# Patient Record
Sex: MALE | Race: WHITE | Employment: UNEMPLOYED | URBAN - METROPOLITAN AREA
[De-identification: names, ages, dates, MRNs, and addresses within clinical notes are randomized per-mention and may not be internally consistent; named-entity substitution may affect disease eponyms.]

---

## 2017-04-04 ENCOUNTER — HOSPITAL ENCOUNTER (OUTPATIENT)
Dept: GENERAL RADIOLOGY | Age: 22
Discharge: HOME | End: 2017-04-04
Payer: COMMERCIAL

## 2017-04-04 DIAGNOSIS — M54.2 NECK PAIN: ICD-10-CM

## 2017-04-04 DIAGNOSIS — M54.50 LOWER BACK PAIN: ICD-10-CM

## 2017-04-04 DIAGNOSIS — M54.9 UPPER BACK PAIN: ICD-10-CM

## 2017-04-04 PROCEDURE — 72050 X-RAY EXAM NECK SPINE 4/5VWS: CPT

## 2017-04-04 PROCEDURE — 72070 X-RAY EXAM THORAC SPINE 2VWS: CPT

## 2017-04-04 PROCEDURE — 72114 X-RAY EXAM L-S SPINE BENDING: CPT

## 2021-06-03 ENCOUNTER — OFFICE VISIT (OUTPATIENT)
Dept: SURGERY | Age: 26
End: 2021-06-03
Payer: MEDICARE

## 2021-06-03 VITALS
TEMPERATURE: 99.4 F | BODY MASS INDEX: 38.21 KG/M2 | WEIGHT: 258 LBS | DIASTOLIC BLOOD PRESSURE: 79 MMHG | RESPIRATION RATE: 18 BRPM | HEIGHT: 69 IN | OXYGEN SATURATION: 97 % | SYSTOLIC BLOOD PRESSURE: 125 MMHG | HEART RATE: 81 BPM

## 2021-06-03 DIAGNOSIS — F41.9 ANXIETY AND DEPRESSION: ICD-10-CM

## 2021-06-03 DIAGNOSIS — F32.A ANXIETY AND DEPRESSION: ICD-10-CM

## 2021-06-03 DIAGNOSIS — K21.9 GASTROESOPHAGEAL REFLUX DISEASE WITHOUT ESOPHAGITIS: ICD-10-CM

## 2021-06-03 DIAGNOSIS — F51.01 PRIMARY INSOMNIA: ICD-10-CM

## 2021-06-03 DIAGNOSIS — E66.01 SEVERE OBESITY (HCC): Primary | ICD-10-CM

## 2021-06-03 PROCEDURE — G8432 DEP SCR NOT DOC, RNG: HCPCS | Performed by: INTERNAL MEDICINE

## 2021-06-03 PROCEDURE — G8427 DOCREV CUR MEDS BY ELIG CLIN: HCPCS | Performed by: INTERNAL MEDICINE

## 2021-06-03 PROCEDURE — G8417 CALC BMI ABV UP PARAM F/U: HCPCS | Performed by: INTERNAL MEDICINE

## 2021-06-03 PROCEDURE — 99205 OFFICE O/P NEW HI 60 MIN: CPT | Performed by: INTERNAL MEDICINE

## 2021-06-03 RX ORDER — CHOLECALCIFEROL (VITAMIN D3) 50 MCG
2000 CAPSULE ORAL DAILY
COMMUNITY

## 2021-06-03 RX ORDER — GLUCOSAMINE SULFATE 1500 MG
1000 POWDER IN PACKET (EA) ORAL DAILY
COMMUNITY

## 2021-06-03 RX ORDER — ZINC GLUCONATE 10 MG
1 LOZENGE ORAL DAILY
COMMUNITY

## 2021-06-03 RX ORDER — ZOLPIDEM TARTRATE 12.5 MG/1
12.5 TABLET, FILM COATED, EXTENDED RELEASE ORAL
COMMUNITY
Start: 2021-05-10

## 2021-06-03 RX ORDER — PANTOPRAZOLE SODIUM 40 MG/1
40 TABLET, DELAYED RELEASE ORAL DAILY
COMMUNITY
Start: 2021-05-31

## 2021-06-03 RX ORDER — ESCITALOPRAM OXALATE 10 MG/1
10 TABLET ORAL DAILY
COMMUNITY
Start: 2021-05-28

## 2021-06-03 RX ORDER — MELATONIN 5 MG
5 CAPSULE ORAL
COMMUNITY

## 2021-06-03 NOTE — PROGRESS NOTES
Franki Jo Medically Supervised   [facility]     INITIAL PHYSICIAN VISIT    HISTORY OF PRESENT ILLNESS    Kendra Ashford is a 30 y.o. male with morbid Obesity Body mass index is 38.1 kg/m². and associated health concerns presents for evaluation and treatment of weight management. He has anxiety and is on Lexapro 10mg daily. He is followed by a psychologist and has been making a lot of progress in his mental health. He takes pantoprazole 40mg for reflux. He has been on Ambien since 2013 for sleep. He also takes melatonin. WEIGHT HISTORY    Current weight 258 lb. Lowest weight 170 lb. Maximum weight 258 lb. Short term goal weight 228 lb in 4 months. Long term goal weight 168 lb. Neck circumference 17.7 in. Waist circumference 50.5 in. Water 49.2. Body fat 31.7. BMI 37.9. Past Weight Loss Programs:  · Success: He previously did a weight loss program in 2016 with Dr. Dana Pimentel and lost 30 lbs, however he gained the weight back. Previous Weight Loss Medications (prescription or herbal): phentermine     Weight Loss Surgeries or Abdominal Surgeries: none    Family Hx of Obesity or Childhood Obesity: none    Hx of Eating Disorders: none    CURRENT HABITS    Eating/Drinking Habits:  · Meals per day/snacking: He eats 4-5 meals per day. He eats when he gets hungry which is usually every 3-4 hours. He eats foods like wheat and cheese and peanut butter jelly sandwiches. He likes to eat breaded chicken, but does not eat a lot of red meat. He eats his last meal 2-3 hrs before going to sleep. · Drinking Habits: He drinks 3 22 oz bottles of water per day. Sleep Habits: He goes to sleep anytime between 10PM and 2AM and will wake up after 3-4 hours and then go back to sleep for another 2 hours. Physical Activity: He is not exercising on a routine basis. He does walk 30-40 min 4-6x per week.        Depression Screening  H/o anxiety disorder on medication      Pt denies any contraindications to VLCD including: history of MI in the last 3 months, Type 1 DM, Type 2 DM, Liver or Kidney disease requiring protein restriction, Recent treatment for Cancer, History of Uric-acid Kidney Stone, Gout, Gall stones, Recent onset of Inflammatory Bowel Disease, or severe Food Allergies. ROS:    Review of Systems negative except as noted above in HPI. ALLERGIES:    Allergies   Allergen Reactions    Suprax [Cefixime] Unknown (comments)     As a child       CURRENT MEDICATIONS:      PAST MEDICAL HISTORY:  Anxiety disorder, Insomnia , GERD    PAST SURGICAL HISTORY:  NOne  FAMILY HISTORY:    Family History   Problem Relation Age of Onset    Cancer Father         non-Hodgkin's Lymphoma    Stroke Maternal Grandmother        SOCIAL HISTORY:    Social History     Socioeconomic History    Marital status: SINGLE     Spouse name: Not on file    Number of children: Not on file    Years of education: Not on file    Highest education level: Not on file   Tobacco Use    Smoking status: Never Smoker    Smokeless tobacco: Never Used     Social Determinants of Health     Financial Resource Strain:     Difficulty of Paying Living Expenses:    Food Insecurity:     Worried About Running Out of Food in the Last Year:     Ran Out of Food in the Last Year:    Transportation Needs:     Lack of Transportation (Medical):  Lack of Transportation (Non-Medical):    Physical Activity:     Days of Exercise per Week:     Minutes of Exercise per Session:    Stress:     Feeling of Stress :    Social Connections:     Frequency of Communication with Friends and Family:     Frequency of Social Gatherings with Friends and Family:     Attends Restorationism Services:     Active Member of Clubs or Organizations:     Attends Club or Organization Meetings:     Marital Status:        IMMUNIZATIONS:    There is no immunization history on file for this patient.       PHYSICAL EXAMINATION    Vital Signs    Visit Vitals  /79 (BP 1 Location: Left upper arm, BP Patient Position: Sitting, BP Cuff Size: Adult long)   Pulse 81   Temp 99.4 °F (37.4 °C) (Oral)   Resp 18   Ht 5' 9\" (1.753 m)   Wt 258 lb (117 kg)   SpO2 97%   BMI 38.10 kg/m²       Weight Metrics 6/3/2021 12/2/2011   Weight 258 lb 186 lb   BMI 38.1 kg/m2 27.87 kg/m2         General appearance - Well nourished. Well appearing. Well developed. No acute distress. Obese. Head - Normocephalic. Atraumatic. Eyes - pupils equal and reactive. Extraocular eye movements intact. Sclera anicteric. Mildly injected sclera. Ears - Hearing is grossly normal bilaterally. Nose - normal and patent. No polyps noted. No erythema. No discharge. Mouth - mucous membranes with adequate moisture. Posterior pharynx normal with cobblestone appearance. No erythema, white exudate or obstruction. Neck - supple. Midline trachea. No carotid bruits noted bilaterally. No thyromegaly noted. Chest - . No wheezes. No rales or rhonchi. Unlabored respirations. Heart - normal rate. Regular rhythm. Normal S1, S2. No murmur noted. No rubs, clicks or gallops noted. Neurological - awake, alert and oriented to person, place, and time and event. Cranial nerves II through XII intact. Clear speech. Muscle strength is +5/5 x 4 extremities. Sensation is intact to light touch bilaterally. Steady gait. Heme/Lymph - peripheral pulses normal x 4 extremities. No peripheral edema is noted. Musculoskeletal - Intact x 4 extremities. Full ROM x 4 extremities. Skin - no rashes, erythema, ecchymosis, lacerations, abrasions, suspicious moles noted. No skin tags or moles. No acanthosis nigricans noted in the axilla or neck. Psychological -   normal behavior, dress and thought processes. Good insight. Good eye contact. Normal affect. Appropriate mood. Normal speech. ASSESSMENT and PLAN    ICD-10-CM ICD-9-CM    1.  Severe obesity (HCC)  E66.01 278.01 We discussed in great length the various diets for weight loss including low carb diet, intermittent fasting, keto diet, pescetarian diet, and mediterranean diet. We discussed a physical activity plan and told him to walk 10 min after every meal and have a regular exercise routine. I did discuss the importance of 5000 steps /day for Healthy life style and more than 10,000 to loose weight. Answers all the questions about weight lifting exercises. Heidi Miranda He is requesting medication since this has helped with weight loss in the past, but I recommend diet and physical activity first.   He and his mother had lot of questions about our Supplements and all concerns were answered. 2. Anxiety and depression  F41.9 300.00 He is on Lexapro by his regular PCP. F32.9 311    3. Gastroesophageal reflux disease without esophagitis  K21.9 530.81 He takes pantoprazole. 4. Primary insomnia  F51.01 307.42 He takes melatonin and Ambien. Referred patient to [facility] Contreras ( )  for LCD package. Exercise: Told patient to wear his FitBit regularly to monitor his steps. Explained that he should be walking 5,000 steps daily to maintain conditioning and weight and increase to at least 10,000 steps to work on losing weight. Patient was offered a choice/choices in the treatment plan today. Patient expresses understanding of the plan and agrees with recommendations. More than 65 mins spent face to face with patient and more than 50% of this time spent in counseling and coordinating care and/or discussing treatment plans in reference to The primary encounter diagnosis was Severe obesity ([address] Diagnoses of Anxiety and depression, Gastroesophageal reflux disease without esophagitis, and Primary insomnia were also pertinent to this visit.     Follow up: 1 week    Written by Aileen Hale, as dictated by Dr. Maryjane Gutierrez MD.

## 2021-06-03 NOTE — PROGRESS NOTES
Seeing Dr. Ronak Henao for Metabolic Clinic. 1. Have you been to the ER, urgent care clinic since your last visit? Hospitalized since your last visit? No    2. Have you seen or consulted any other health care providers outside of the [address] [place] [address] since your last visit? Include any pap smears or colon screening.  Yes, PCP     BMI -37.9  Body Fat - 31.7  Water - 49.2    Neck - 17.5  Waist - 50.5

## 2021-06-09 ENCOUNTER — CLINICAL SUPPORT (OUTPATIENT)
Dept: BARIATRICS/WEIGHT MGMT | Age: 26
End: 2021-06-09

## 2021-06-09 DIAGNOSIS — E66.01 MORBID OBESITY (HCC): Primary | ICD-10-CM

## 2021-06-09 NOTE — PROGRESS NOTES
Metabolic Program Initial Nutrition Consult    Date: 2021   Physician: Dr. Marcos Bryant  Name: Joel Barajas  :  1995  Age:  30  Gender: M  Type of Plan: LCD  Weeks on Plan:  1 week    ASSESSMENT:      Medications/Supplements:   Prior to Admission medications    Medication Sig Start Date End Date Taking? Authorizing Provider   pantoprazole (PROTONIX) 40 mg tablet Take 40 mg by mouth daily. 21   Provider, Historical   escitalopram oxalate (LEXAPRO) 10 mg tablet Take 10 mg by mouth daily. 21   Provider, Historical   zolpidem CR (AMBIEN CR) 12.5 mg tablet TAKE 1 TABLET BY MOUTH EVERY DAY AT BEDTIME AS NEEDED 5/10/21   Provider, Historical   MEN'S MULTI-VITAMIN PO Take 1 Tablet by mouth daily. Provider, Historical   omega 3-dha-epa-fish oil (Fish Oil) 100-160-1,000 mg cap Take 2,000 mg by mouth daily. Provider, Historical   cholecalciferol (Vitamin D3) 25 mcg (1,000 unit) cap Take 1,000 Units by mouth daily. Provider, Historical   magnesium 250 mg tab Take 1 Tablet by mouth daily. Provider, Historical   melatonin 5 mg cap capsule Take 5 mg by mouth nightly. Provider, Historical       Food Allergies/Intolerances: no known food allergies    Anthropometrics:    Ht:69\"   Wt: 258#    IBW: 159#       BMI:38    Category: Obesity Class II    Reported wt history: Pt presents today for initial nutrition consult for the [address]      Attributes wt gain over the years r/t eating in excess and not appropriate choices. .     Has attempted wt loss through various methods with most successful wt loss of phentermine. Exercise/Physical Activity: none at this time but has a goal to start weight lifting after he loses some weight.     Reported Diet History:  Ate all bars within first two days, no meal replacement shakes  Now consuming 2-3 shakes per day mixed with water  Meal/Snack:  Not consuming a balanced meal.  Only consuming 1 cup of plain fage yogurt 0% milk fat with blueberries that he estimates is approx. 300 calories    Patient has a very limited diet, will not eat most foods - denied it was because of texture or taste, just \"didn't like. \"  Will eat:  breaded chicken tenders, tilapia, eggs. Only vegetable he will eat is broccoli and corn with butter spray or veggie pasta with zucchini and lentils that has 51 g CHO per bag. Greatly dislikes others, especially carrots and cauliflower. Loves pizza, cheese and crackers. Has not had pizza or cheese since starting program last week. Fluid: 72 oz water every day. Flavors with skittles drink mix that contains 2 grams CHO per packet. Consumes at least 6 packets a day. No coffee, no tea, no alcohol. Environment/Psychosocial/Support:parents . Lives with both, currently at dad's house during call. NUTRITION INTERVENTION:  Pt educated on nutrition recommendations for LCD, specifically balanced meals and snacks PLUS two shakes per day. Instructed on consuming 1 balanced meal per day starting now, not just consuming yogurt with blueberries every day. Use the balanced plate method to plan meals, include 4-6oz of lean source of protein, unlimited non-starchy vegetables. Limit whole grains, fruit and low fat dairy. Consume only one of these three at meal and only a half a cup. Utilize handouts listing healthy snack and meal ideas. Read all nutrition labels. Demonstrated and emphasized identifying serving size, total fat, sugar and protein content. Discussed lean and extra lean sources of protein. Avoid foods with sugar listed in the first 3 ingredients and >/10 g sugar per serving. Practice mindful eating habits; take small bites, chew thoroughly, avoid distractions, utilize hunger/fullness scale. Consume meals over 20-30 minutes. Attend Metabolic Support Group and increase physical activity (as approved by MD) for long term weight maintenance.       NUTRITION MONITORING AND EVALUATION:    The following goals were established with patient;  1) garlic powder to encourage intake of other vegetables  2) increase variety with meals, try veggie pasta (tennis ball size) with broccoli and fish for a meal this week. 3) Limit skittles packets to 1-2 per day reduce excess CHO intake. 4) Do not consume more than one protein bar from program in a day. Should be two meal replacement powders PLUS one snack (could be bar or powder) AND one balanced meal..      Very simple goals to start. Patient required extensive encouraging and education as to why he cannot only consume the same thing every single day. Adherence to program and goals questionable at this time. Routine follow-up is highly encouraged in a one on one setting with RD rather than only attending group education. Follow-up scheduled for next week. Specific tips and techniques to facilitate compliance with above recommendations were provided and discussed. If further details are desired please contact me at [phone]. This phone number was also provided to the patient for any further questions or concerns.           Katelin Colorado MS, RD, LDN

## 2021-06-10 ENCOUNTER — OFFICE VISIT (OUTPATIENT)
Dept: SURGERY | Age: 26
End: 2021-06-10
Payer: MEDICARE

## 2021-06-10 VITALS
WEIGHT: 251.3 LBS | SYSTOLIC BLOOD PRESSURE: 130 MMHG | HEIGHT: 69 IN | HEART RATE: 93 BPM | OXYGEN SATURATION: 96 % | RESPIRATION RATE: 18 BRPM | BODY MASS INDEX: 37.22 KG/M2 | DIASTOLIC BLOOD PRESSURE: 78 MMHG | TEMPERATURE: 98.5 F

## 2021-06-10 DIAGNOSIS — F51.01 PRIMARY INSOMNIA: ICD-10-CM

## 2021-06-10 DIAGNOSIS — E66.01 SEVERE OBESITY (HCC): Primary | ICD-10-CM

## 2021-06-10 DIAGNOSIS — F41.9 ANXIETY: ICD-10-CM

## 2021-06-10 PROCEDURE — 99214 OFFICE O/P EST MOD 30 MIN: CPT | Performed by: INTERNAL MEDICINE

## 2021-06-10 PROCEDURE — G8432 DEP SCR NOT DOC, RNG: HCPCS | Performed by: INTERNAL MEDICINE

## 2021-06-10 PROCEDURE — G8427 DOCREV CUR MEDS BY ELIG CLIN: HCPCS | Performed by: INTERNAL MEDICINE

## 2021-06-10 PROCEDURE — G8417 CALC BMI ABV UP PARAM F/U: HCPCS | Performed by: INTERNAL MEDICINE

## 2021-06-10 NOTE — PROGRESS NOTES
Metabolic Clinic follow up. 1. Have you been to the ER, urgent care clinic since your last visit? Hospitalized since your last visit? No    2. Have you seen or consulted any other health care providers outside of the [address] [place] [address] since your last visit? Include any pap smears or colon screening.  No     BMI - 36.9  Water - 49.2

## 2021-06-10 NOTE — PROGRESS NOTES
Franki Jo Medically Supervised   [facility] Loss [facility]     FOLLOW-UP PHYSICIAN VISIT    HISTORY OF PRESENT ILLNESS    Lisa Xiong is a 30 y.o. male with Obesity Body mass index is 37.11 kg/m². and associated health concerns presents for evaluation and treatment of weight management. WEIGHT HISTORY    Current weight 251 lb. Weight change since previous visit -7 lbs in 1 week. Short term goal weight 228 lb in 4 months. Neck circumference 17.5 in. Neck circumference change -0.2 in. Waist circumference 49.5 in. Waist circumference change -1 in. Water 49.2. Water change 0. Body fat 31.7%. Body fat change 0%. Current BMI 36.9. CURRENT HABITS    Eating/Drinking Habits:   Meals per day/snacking: He drinks 2-3 shakes (600 calories per day), 1-2 proteins bars (120 calories per bar), and 2 servings of yogurt and blueberries (300 calories). He felt tired when he first started the diet, but has been feeling well recently. Sleep Habits: He has been sleeping well at night. He takes melatonin and Ambien for sleep. Physical Activity: He has not started exercising. He is moving to a new room and will not have access to his exercise equipment for the next 2 weeks. Depression Screening  Doing well on Lexapro. Not feeling depress or anxious. Pt denies any contraindications to VLCD including: history of MI in the last 3 months, Type 1 DM, Type 2 DM, Liver or Kidney disease requiring protein restriction, Recent treatment for Cancer, History of Uric-acid Kidney Stone, Gout, Gall stones, Recent onset of Inflammatory Bowel Disease, or severe Food Allergies. ROS:    Review of Systems negative except as noted above in HPI.        ALLERGIES:    Allergies   Allergen Reactions    Suprax [Cefixime] Unknown (comments)     As a child       CURRENT MEDICATIONS:    Current Outpatient Medications   Medication Instructions    cholecalciferol (VITAMIN D3) 1,000 Units, Oral, DAILY    escitalopram oxalate (LEXAPRO) 10 mg, Oral, DAILY    magnesium 250 mg tab 1 Tablet, Oral, DAILY    melatonin 5 mg, Oral, EVERY BEDTIME    MEN'S MULTI-VITAMIN PO 1 Tablet, Oral, DAILY    omega 3-dha-epa-fish oil (Fish Oil) 100-160-1,000 mg cap 2,000 mg, Oral, DAILY    pantoprazole (PROTONIX) 40 mg, Oral, DAILY    zolpidem CR (AMBIEN CR) 12.5 mg, Oral, EVERY BEDTIME       PAST MEDICAL HISTORY:  No past medical history on file. PAST SURGICAL HISTORY:  No past surgical history on file. FAMILY HISTORY:    Family History   Problem Relation Age of Onset    Cancer Father         non-Hodgkin's Lymphoma    Stroke Maternal Grandmother        SOCIAL HISTORY:    Social History     Socioeconomic History    Marital status: SINGLE     Spouse name: Not on file    Number of children: Not on file    Years of education: Not on file    Highest education level: Not on file   Tobacco Use    Smoking status: Never Smoker    Smokeless tobacco: Never Used     Social Determinants of Health     Financial Resource Strain:     Difficulty of Paying Living Expenses:    Food Insecurity:     Worried About Running Out of Food in the Last Year:     [address] in the Last Year:    Transportation Needs:     Lack of Transportation (Medical):      Lack of Transportation (Non-Medical):    Physical Activity:     Days of Exercise per Week:     Minutes of Exercise per Session:    Stress:     Feeling of Stress :    Social Connections:     Frequency of Communication with Friends and Family:     Frequency of Social Gatherings with Friends and Family:     Attends Restorationist Services:     Active Member of Clubs or Organizations:     Attends Club or Organization Meetings:     Marital Status:          PHYSICAL EXAMINATION    Vital Signs    Visit Vitals  /78 (BP 1 Location: Left upper arm, BP Patient Position: Sitting, BP Cuff Size: Adult long)   Pulse 93   Temp 98.5 °F (36.9 °C) (Oral)   Resp 18   Ht 5' 9\" (1.753 m)   Wt 251 lb 4.8 oz (114 kg) SpO2 96%   BMI 37.11 kg/m²       Weight Metrics 6/10/2021 6/10/2021 6/3/2021 12/2/2011   Weight - 251 lb 4.8 oz 258 lb 186 lb   Neck Circ (inches) 17.5 - - -   Waist Measure Inches 49.5 - - -   Body Fat % 31.7 - - -   BMI - 37.11 kg/m2 38.1 kg/m2 27.87 kg/m2         General appearance - Well nourished. Well appearing. Well developed. No acute distress. Obese. Head - Normocephalic. Atraumatic. Eyes - pupils equal and reactive. Extraocular eye movements intact. Sclera anicteric. Ears - Hearing is grossly normal bilaterally. Nose - normal and patent. No polyps noted. No erythema. No discharge. Mouth - mucous membranes with adequate moisture. Posterior pharynx normal with cobblestone appearance. Neck - supple. ROM cervical spine normal.   Chest - . No wheezing . Unlabored respirations. Heart - normal rate. Regular rhythm. Normal S1, S2.   Abdomen - soft and distended. No masses or organomegaly. .  Neurological - awake, alert and oriented to person, place, and time and event. Clear speech. Muscle strength is +5/5 x 4 extremities. Steady gait. Heme/Lymph - peripheral pulses normal x 4 extremities. No peripheral edema is noted. Musculoskeletal - Intact x 4 extremities. Full ROM x 4 extremities. No pain with movement. Back exam - normal range of motion. .  No buffalo hump noted. Skin - no rashes, erythema, ecchymosis, lacerations, abrasions, suspicious moles noted. No skin tags or moles. No acanthosis nigricans noted in the axilla or neck. Psychological -   normal behavior, dress and thought processes. Good insight. Good eye contact. Normal affect. Appropriate mood. Normal speech. ASSESSMENT and PLAN    ICD-10-CM ICD-9-CM    1. Severe obesity ([facility] [address]  E66.01 278.01 He is doing well on the LCD and has lost 7 lbs since 06/03/21, but has not started exercising.  He cannot do any structured exercise for the next 2 weeks so I encouraged home to walk at least 10 min after every meal. Discussed that with more physical activity he may feel more tired and need to increase daily calories to 1500/day. We will continue him on LCD. 2. Anxiety  F41.9 300.00 He is on Lexapro by his regular PCP. 3. Primary insomnia  F51.01 307.42 He takes melatonin and Ambien. Patient was offered a choice/choices in the treatment plan today. Patient expresses understanding of the plan and agrees with recommendations. More than 30 mins spent face to face with patient and more than 50% of this time spent in counseling and coordinating care and/or discussing treatment plans in reference to The primary encounter diagnosis was Severe obesity ([facility] [address] Diagnoses of Anxiety and Primary insomnia were also pertinent to this visit.     Follow up: 2 weeks     Written by Denys Whitman, as dictated by Dr. Doug Rivas MD.

## 2021-06-17 ENCOUNTER — CLINICAL SUPPORT (OUTPATIENT)
Dept: SURGERY | Age: 26
End: 2021-06-17

## 2021-06-17 DIAGNOSIS — E66.9 OBESITY (BMI 30-39.9): Primary | ICD-10-CM

## 2021-06-22 ENCOUNTER — OFFICE VISIT (OUTPATIENT)
Dept: SURGERY | Age: 26
End: 2021-06-22
Payer: MEDICARE

## 2021-06-22 VITALS
DIASTOLIC BLOOD PRESSURE: 84 MMHG | SYSTOLIC BLOOD PRESSURE: 128 MMHG | OXYGEN SATURATION: 95 % | BODY MASS INDEX: 36.7 KG/M2 | HEIGHT: 69 IN | WEIGHT: 247.8 LBS | RESPIRATION RATE: 18 BRPM | TEMPERATURE: 99.3 F | HEART RATE: 68 BPM

## 2021-06-22 DIAGNOSIS — Z13.220 SCREENING FOR HYPERLIPIDEMIA: ICD-10-CM

## 2021-06-22 DIAGNOSIS — Z13.29 SCREENING FOR HYPOTHYROIDISM: ICD-10-CM

## 2021-06-22 DIAGNOSIS — Z13.1 SCREENING FOR DIABETES MELLITUS: ICD-10-CM

## 2021-06-22 DIAGNOSIS — E66.01 CLASS 3 SEVERE OBESITY DUE TO EXCESS CALORIES WITHOUT SERIOUS COMORBIDITY IN ADULT, UNSPECIFIED BMI (HCC): Primary | ICD-10-CM

## 2021-06-22 PROCEDURE — 99214 OFFICE O/P EST MOD 30 MIN: CPT | Performed by: FAMILY MEDICINE

## 2021-06-22 PROCEDURE — G8510 SCR DEP NEG, NO PLAN REQD: HCPCS | Performed by: FAMILY MEDICINE

## 2021-06-22 PROCEDURE — G8417 CALC BMI ABV UP PARAM F/U: HCPCS | Performed by: FAMILY MEDICINE

## 2021-06-22 PROCEDURE — G8427 DOCREV CUR MEDS BY ELIG CLIN: HCPCS | Performed by: FAMILY MEDICINE

## 2021-06-22 NOTE — PROGRESS NOTES
1. Have you been to the ER, urgent care clinic since your last visit? Hospitalized since your last visit? no    2. Have you seen or consulted any other health care providers outside of the [address] [place] [address] since your last visit? Include any pap smears or colon screening.

## 2021-06-22 NOTE — PROGRESS NOTES
[facility] Progress Note:   F/up Physician Visit  [facility]  CC: Weight Management    He is having 2 300 irene meals and then 3 products of ours    aTina Mckeon is a 30 y.o. male who is here for his  f/up physician visit for LCD Program.  May 251  Now 147  He did not bring the food diary      Weight Metrics 6/22/2021 6/22/2021 6/10/2021 6/10/2021 6/3/2021 12/2/2011   Weight - 247 lb 12.8 oz - 251 lb 4.8 oz 258 lb 186 lb   Neck Circ (inches) 17 - 17.5 - - -   Waist Measure Inches 49.5 - 49.5 - - -   Body Fat % 31 - 31.7 - - -   BMI - 36.59 kg/m2 - 37.11 kg/m2 38.1 kg/m2 27.87 kg/m2         Outpatient Medications Marked as Taking for the 6/22/21 encounter (Office Visit) with Tiara Keenan MD   Medication Sig Dispense Refill    pantoprazole (PROTONIX) 40 mg tablet Take 40 mg by mouth daily.  escitalopram oxalate (LEXAPRO) 10 mg tablet Take 10 mg by mouth daily.  zolpidem CR (AMBIEN CR) 12.5 mg tablet Take 12.5 mg by mouth nightly.  MEN'S MULTI-VITAMIN PO Take 1 Tablet by mouth daily.  omega 3-dha-epa-fish oil (Fish Oil) 100-160-1,000 mg cap Take 2,000 mg by mouth daily.  cholecalciferol (Vitamin D3) 25 mcg (1,000 unit) cap Take 1,000 Units by mouth daily.  magnesium 250 mg tab Take 1 Tablet by mouth daily.  melatonin 5 mg cap capsule Take 5 mg by mouth nightly. Participation   Did you attend clinic and class last week? yes    Review of Systems  Since your last visit, have you experienced any complications? no  If yes, please list:       Are you taking an appetite suppressant? no  If so, is there any Chest Pain, Palpitations or Dizziness? BP Readings from Last 3 Encounters:   06/22/21 128/84   06/10/21 130/78   06/03/21 125/79       SLEEP:frequently interrupted, avg 5 hrs    Have you received any other medical care this week? no  If yes, where and for what?        Have you discontinued or changed any medicine or dose of your medicine since your last visit with Dr Lalo Blancas? no  If yes, where and for what? Diet  How many ounces of calorie-free liquids did you consume each day?  90 oz    How many meal replacements did you take each day? 2-3    Did you have any problems adhering to the program?  no If yes, please explain:      Exercise  Aerobic exercise: went bowling last sunday  Resistance exercise: 0 workouts / week  Any discomfort?  no     If yes, where? Objective  Visit Vitals  /84   Pulse 68   Temp 99.3 °F (37.4 °C) (Oral)   Resp 18   Ht 5' 9\" (1.753 m)   Wt 247 lb 12.8 oz (112.4 kg)   SpO2 95%   BMI 36.59 kg/m²     No LMP for male patient. Physical Exam  Appearance: well,   Mental:A&O x 3, NAD  H:NC/AT,  EENT:   EOMI, PERRL, No scleral icterus  Neck: no bruit or JVD  Lung: clear, No W/R  ABD: soft, active, nontender  Ext:  no Edema  Neuro: nonfocal  Assessment / Plan    Encounter Diagnoses   Name Primary?  Class 3 severe obesity due to excess calories without serious comorbidity in adult, unspecified BMI ([facility] [address] Yes    BMI 36.0-36.9,adult     Screening for hypothyroidism     Screening for diabetes mellitus     Screening for hyperlipidemia      Diagnoses and all orders for this visit:    1. Class 3 severe obesity due to excess calories without serious comorbidity in adult, unspecified BMI (HCC)  -     METABOLIC PANEL, COMPREHENSIVE; Future  -     TSH 3RD GENERATION; Future  -     LIPID PANEL; Future    2. BMI 47.2-58.5,OVMID  -     METABOLIC PANEL, COMPREHENSIVE; Future  -     TSH 3RD GENERATION; Future  -     LIPID PANEL; Future    3. Screening for hypothyroidism  -     TSH 3RD GENERATION; Future    4. Screening for diabetes mellitus  -     HEMOGLOBIN A1C WITH EAG; Future    5. Screening for hyperlipidemia  -     LIPID PANEL; Future      1. Weight management improved   Progress was reviewed with patient    2.   Labs    Latest results reviewed with patient         Over 25 minutes of the 30 minutes face to face time with Rody Shipley consisted of counseling & coordinating and/or discussing treatment plans in reference to his obesity The primary encounter diagnosis was Class 3 severe obesity due to excess calories without serious comorbidity in adult, unspecified BMI ([facility] [address] Diagnoses of BMI 36.0-36.9,adult, Screening for hypothyroidism, Screening for diabetes mellitus, and Screening for hyperlipidemia were also pertinent to this visit.

## 2021-06-23 ENCOUNTER — CLINICAL SUPPORT (OUTPATIENT)
Dept: SURGERY | Age: 26
End: 2021-06-23

## 2021-06-30 ENCOUNTER — CLINICAL SUPPORT (OUTPATIENT)
Dept: SURGERY | Age: 26
End: 2021-06-30

## 2021-07-01 NOTE — PROGRESS NOTES
Metabolic Program Followup Nutrition Consult    Date: 2021   Physician: Guido Cranker, MD  Name: Celia Munoz  :  1995    Type of Plan: LCD  Weeks on Plan: 1  Virtual visit was completed through [address] [place] [address]. Mother was present on Zoom call. ASSESSMENT:    Medications/Supplements:   Prior to Admission medications    Medication Sig Start Date End Date Taking? Authorizing Provider   pantoprazole (PROTONIX) 40 mg tablet Take 40 mg by mouth daily. 21   Provider, Historical   escitalopram oxalate (LEXAPRO) 10 mg tablet Take 10 mg by mouth daily. 21   Provider, Historical   zolpidem CR (AMBIEN CR) 12.5 mg tablet Take 12.5 mg by mouth nightly. 5/10/21   Provider, Historical   MEN'S MULTI-VITAMIN PO Take 1 Tablet by mouth daily. Provider, Historical   omega 3-dha-epa-fish oil (Fish Oil) 100-160-1,000 mg cap Take 2,000 mg by mouth daily. Provider, Historical   cholecalciferol (Vitamin D3) 25 mcg (1,000 unit) cap Take 1,000 Units by mouth daily. Provider, Historical   magnesium 250 mg tab Take 1 Tablet by mouth daily. Provider, Historical   melatonin 5 mg cap capsule Take 5 mg by mouth nightly. Provider, Historical     Exercise/Physical Activity:none    Reported Diet History: 3 packets and yogurt plus blueberries, staying within 4317-3181 calories per day. Water with skittles flavoring packets, 60+ oz every day. Environment/Psychosocial/Support:mother supportive and encouraging throughout call to try new foods. NUTRITION INTERVENTION:  Pt educated on nutrition recommendations for LCD, specifically increasing variety with grocery meal using pattern described. Grocery meal:  Use the balanced plate method to plan meals, include 3-6 oz of lean source of protein, unlimited non-starchy vegetables, 1/2 cup whole grains/beans OR 1/2 cup fruit OR 1 serving of low fat dairy. Utilize handouts listing healthy snack and meal ideas to limit restaurant meals. Read all nutrition labels. Demonstrated and emphasized identifying serving size, total fat, sugar and protein content. Defined low fat as </= 3 g per serving. Discussed lean and extra lean sources of protein. Provided list of low fat cooking methods. Avoid foods with sugar listed in the first 3 ingredients and >/15 g sugar per serving. Excess sugar/fat intake may lead to dumping syndrome. Discussed signs and symptoms of dumping syndrome. Practice mindful eating habits; take small bites, chew thoroughly, avoid distractions, utilize hunger/fullness scale. Attend Metabolic Support Group and increase physical activity (approved per MD) for long term weight maintenance. NUTRITION MONITORING AND EVALUATION:    The following goals were established with patient;  1) try bbq chicken  2) try asparagus  3) try a new meat. 4) increase movement as tolerated    Limited variety of foods, most of the time was spent trying to agree on one or two new foods to try by next visit. Specific tips and techniques to facilitate compliance with above recommendations were provided and discussed. If further details are desired please contact me at [phone]. This phone number was also provided to the patient for any further questions or concerns.           Edu Payton, MS, RD, LDN

## 2021-07-01 NOTE — PROGRESS NOTES
Metabolic Program Follow-up Nutrition Consult    Date: 2021   Physician: Shahida Kuhn MD, previously saw David Bower MD  Name: Dolores Dinero  :  1995  Age:  30  Gender: M  Type of Plan: LCD  Weeks on Plan: 3  Virtual visit was completed through Zoom. ASSESSMENT:    Medications/Supplements:   Prior to Admission medications    Medication Sig Start Date End Date Taking? Authorizing Provider   pantoprazole (PROTONIX) 40 mg tablet Take 40 mg by mouth daily. 21   Provider, Historical   escitalopram oxalate (LEXAPRO) 10 mg tablet Take 10 mg by mouth daily. 21   Provider, Historical   zolpidem CR (AMBIEN CR) 12.5 mg tablet Take 12.5 mg by mouth nightly. 5/10/21   Provider, Historical   MEN'S MULTI-VITAMIN PO Take 1 Tablet by mouth daily. Provider, Historical   omega 3-dha-epa-fish oil (Fish Oil) 100-160-1,000 mg cap Take 2,000 mg by mouth daily. Provider, Historical   cholecalciferol (Vitamin D3) 25 mcg (1,000 unit) cap Take 1,000 Units by mouth daily. Provider, Historical   magnesium 250 mg tab Take 1 Tablet by mouth daily. Provider, Historical   melatonin 5 mg cap capsule Take 5 mg by mouth nightly. Provider, Historical       Wt loss: down 10# since start of program 3 weeks ago. Exercise/Physical Activity:none    Reported Diet History:    24 Hour Diet Recall  Breakfast  shake   Lunch  Chicken tenders   Dinner  shake   Snacks  shake   Beverages  Water 60-90 oz every day       Environment/Psychosocial/Support:mother supportive    NUTRITION INTERVENTION:  Pt educated on nutrition recommendations for LCD, specifically trying to increase variety of options at grocery meal following grocery meal pattern. Encouraged him to try one new food on vacation next week, he chose to try shrimp, lobster, or crab. He has stated he has a \"childlike\" pallet. Trying new foods has been difficult since program initiation, not trying options discussed in last visit.     Grocery meal:  Use the balanced plate method to plan meals, include 3-6 oz of lean source of protein, unlimited non-starchy vegetables, 1/2 cup whole grains/beans OR 1/2 cup fruit OR 1 serving of low fat dairy. Utilize handouts listing healthy snack and meal ideas to limit restaurant meals. Read all nutrition labels. Demonstrated and emphasized identifying serving size, total fat, sugar and protein content. Defined low fat as </= 3 g per serving. Discussed lean and extra lean sources of protein. Provided list of low fat cooking methods. Avoid foods with sugar listed in the first 3 ingredients and >/15 g sugar per serving. Excess sugar/fat intake may lead to dumping syndrome. Discussed signs and symptoms of dumping syndrome. Practice mindful eating habits; take small bites, chew thoroughly, avoid distractions, utilize hunger/fullness scale. Attend Metabolic Support Group and increase physical activity (approved per MD) for long term weight maintenance. NUTRITION MONITORING AND EVALUATION:    The following goals were established with patient;  1) try crab, lobster, or shrimp at beach on vacation  2) add a green option to meal such as trying asparagus  3) start increasing movement, walk 5-10 minutes 2-3 times per day    Pt is doing very well staying in specified calorie recommendations of 1000-1200kcals every day, reading labels, and measuring. Encouraged to try ONE new food before next visit. He verbalized compliance to this, although questionable if he will. Specific tips and techniques to facilitate compliance with above recommendations were provided and discussed. If further details are desired please contact me at [phone]. This phone number was also provided to the patient for any further questions or concerns.           Eris Rivera, MS, RD, LDN

## 2021-07-01 NOTE — PROGRESS NOTES
Metabolic Program Followup Nutrition Consult    Date: 2021  Physician: Deena Mendez MD  Name: Richad Gottron  :  1995   Type of Plan: LCD  Weeks on Plan: 2  Virtual visit was completed through [address] [place] [address]. ASSESSMENT:    Medications/Supplements:   Prior to Admission medications    Medication Sig Start Date End Date Taking? Authorizing Provider   pantoprazole (PROTONIX) 40 mg tablet Take 40 mg by mouth daily. 21   Provider, Historical   escitalopram oxalate (LEXAPRO) 10 mg tablet Take 10 mg by mouth daily. 21   Provider, Historical   zolpidem CR (AMBIEN CR) 12.5 mg tablet Take 12.5 mg by mouth nightly. 5/10/21   Provider, Historical   MEN'S MULTI-VITAMIN PO Take 1 Tablet by mouth daily. Provider, Historical   omega 3-dha-epa-fish oil (Fish Oil) 100-160-1,000 mg cap Take 2,000 mg by mouth daily. Provider, Historical   cholecalciferol (Vitamin D3) 25 mcg (1,000 unit) cap Take 1,000 Units by mouth daily. Provider, Historical   magnesium 250 mg tab Take 1 Tablet by mouth daily. Provider, Historical   melatonin 5 mg cap capsule Take 5 mg by mouth nightly. Provider, Historical     Down a few pounds since last week. Still feeling motivated. Still restrictive eating, hasn't tried options discussed in last visit. Exercise/Physical Activity:none    Reported Diet History: 3 packets and 3 eggs PLUS 1/4 cup cheese with broccoli and yogurt with blueberries    Environment/Psychosocial/Support:family supportive    NUTRITION INTERVENTION:  Pt educated on nutrition recommendations for LCD, specifically adding variety to grocery meal using pattern described below. Continue using nutrition labels as guidance to stay within 9908-7624 cals. Grocery meal:  Use the balanced plate method to plan meals, include 3-6 oz of lean source of protein, unlimited non-starchy vegetables, 1/2 cup whole grains/beans OR 1/2 cup fruit OR 1 serving of low fat dairy.  Utilize handouts listing healthy snack and meal ideas to limit restaurant meals. Read all nutrition labels. Demonstrated and emphasized identifying serving size, total fat, sugar and protein content. Defined low fat as </= 3 g per serving. Discussed lean and extra lean sources of protein. Provided list of low fat cooking methods. Avoid foods with sugar listed in the first 3 ingredients and >/15 g sugar per serving. Excess sugar/fat intake may lead to dumping syndrome. Discussed signs and symptoms of dumping syndrome. Practice mindful eating habits; take small bites, chew thoroughly, avoid distractions, utilize hunger/fullness scale. Attend Metabolic Support Group and increase physical activity (approved per MD) for long term weight maintenance. NUTRITION MONITORING AND EVALUATION:    The following goals were established with patient;  1) try cauliflower  2) try tilapia  3) one protein bar every day  4) increase movement as tolerated. Specific tips and techniques to facilitate compliance with above recommendations were provided and discussed. If further details are desired please contact me at [phone]. This phone number was also provided to the patient for any further questions or concerns.           Iwona Che, MS, RD, LDN

## 2021-07-06 LAB
ALBUMIN SERPL-MCNC: 4.6 G/DL (ref 4.1–5.2)
ALBUMIN/GLOB SERPL: 2.2 {RATIO} (ref 1.2–2.2)
ALP SERPL-CCNC: 74 IU/L (ref 48–121)
ALT SERPL-CCNC: 35 IU/L (ref 0–44)
AST SERPL-CCNC: 24 IU/L (ref 0–40)
BILIRUB SERPL-MCNC: 0.5 MG/DL (ref 0–1.2)
BUN SERPL-MCNC: 19 MG/DL (ref 6–20)
BUN/CREAT SERPL: 19 (ref 9–20)
CALCIUM SERPL-MCNC: 10 MG/DL (ref 8.7–10.2)
CHLORIDE SERPL-SCNC: 102 MMOL/L (ref 96–106)
CHOLEST SERPL-MCNC: 197 MG/DL (ref 100–199)
CO2 SERPL-SCNC: 26 MMOL/L (ref 20–29)
CREAT SERPL-MCNC: 0.98 MG/DL (ref 0.76–1.27)
GLOBULIN SER CALC-MCNC: 2.1 G/DL (ref 1.5–4.5)
GLUCOSE SERPL-MCNC: 90 MG/DL (ref 65–99)
HBA1C MFR BLD: 4.9 % (ref 4.8–5.6)
HDLC SERPL-MCNC: 36 MG/DL
LDLC SERPL CALC-MCNC: 131 MG/DL (ref 0–99)
POTASSIUM SERPL-SCNC: 5 MMOL/L (ref 3.5–5.2)
PROT SERPL-MCNC: 6.7 G/DL (ref 6–8.5)
SODIUM SERPL-SCNC: 140 MMOL/L (ref 134–144)
TRIGL SERPL-MCNC: 169 MG/DL (ref 0–149)
TSH SERPL DL<=0.005 MIU/L-ACNC: 1 UIU/ML (ref 0.45–4.5)
VLDLC SERPL CALC-MCNC: 30 MG/DL (ref 5–40)

## 2021-07-11 NOTE — PROGRESS NOTES
The kidney and liver tests are normal  The blood sugar test is normal  Your cholesterol numbers are not at goal. To provide you with the best heart health the LDL should be under 100 if you have no heart disease or history of diabetes. If you have a history of diabetes or heart disease the LDL goal should be less than 70. Avoid fried food, fast food and junk food. Also move more each day. aim for at least 150 minutes of activity each week.  I want to recheck the cholesterol levels in three months   The thyroid test is normal

## 2021-07-12 ENCOUNTER — OFFICE VISIT (OUTPATIENT)
Dept: SURGERY | Age: 26
End: 2021-07-12
Payer: MEDICARE

## 2021-07-12 ENCOUNTER — TELEPHONE (OUTPATIENT)
Dept: FAMILY MEDICINE CLINIC | Age: 26
End: 2021-07-12

## 2021-07-12 VITALS
TEMPERATURE: 98.7 F | DIASTOLIC BLOOD PRESSURE: 76 MMHG | HEIGHT: 69 IN | RESPIRATION RATE: 18 BRPM | BODY MASS INDEX: 35.4 KG/M2 | SYSTOLIC BLOOD PRESSURE: 121 MMHG | WEIGHT: 239 LBS | HEART RATE: 69 BPM | OXYGEN SATURATION: 98 %

## 2021-07-12 DIAGNOSIS — E66.09 CLASS 2 OBESITY DUE TO EXCESS CALORIES WITHOUT SERIOUS COMORBIDITY WITH BODY MASS INDEX (BMI) OF 35.0 TO 35.9 IN ADULT: Primary | ICD-10-CM

## 2021-07-12 DIAGNOSIS — E78.00 HIGH CHOLESTEROL: ICD-10-CM

## 2021-07-12 DIAGNOSIS — Z82.49 FAMILY HISTORY OF ABDOMINAL AORTIC ANEURYSM (AAA): ICD-10-CM

## 2021-07-12 DIAGNOSIS — F51.01 PRIMARY INSOMNIA: ICD-10-CM

## 2021-07-12 DIAGNOSIS — Z82.62 FAMILY HISTORY OF DISUSE OSTEOPOROSIS: ICD-10-CM

## 2021-07-12 DIAGNOSIS — R63.4 WEIGHT LOSS: ICD-10-CM

## 2021-07-12 DIAGNOSIS — Z82.3 FAMILY HISTORY OF STROKE: ICD-10-CM

## 2021-07-12 DIAGNOSIS — F98.8 ATTENTION DEFICIT DISORDER (ADD) WITHOUT HYPERACTIVITY: ICD-10-CM

## 2021-07-12 DIAGNOSIS — F41.1 GENERALIZED ANXIETY DISORDER: ICD-10-CM

## 2021-07-12 DIAGNOSIS — E55.9 VITAMIN D DEFICIENCY: ICD-10-CM

## 2021-07-12 DIAGNOSIS — F88 SID (SENSORY INTEGRATION DISORDER): ICD-10-CM

## 2021-07-12 DIAGNOSIS — F84.5 ASPERGER'S SYNDROME: ICD-10-CM

## 2021-07-12 DIAGNOSIS — Z83.49 FAMILY HISTORY OF HYPERTHYROIDISM: ICD-10-CM

## 2021-07-12 DIAGNOSIS — Z80.7 FAMILY HISTORY OF NON-HODGKIN'S LYMPHOMA: ICD-10-CM

## 2021-07-12 PROCEDURE — 99215 OFFICE O/P EST HI 40 MIN: CPT | Performed by: FAMILY MEDICINE

## 2021-07-12 RX ORDER — CHOLECALCIFEROL (VITAMIN D3) 50 MCG
CAPSULE ORAL
COMMUNITY

## 2021-07-12 NOTE — PATIENT INSTRUCTIONS
.bsbsw      Congratulations on starting the Low Calorie Diet! Consume 2-3 meal replacements a day and 1 \"grocery\" meal.  The \"grocery\" meal should primarily consist of protein and green vegetables. Please meet with the dietician to learn how to calculate your total calories daily and/or use one of the suggested Mobile Apps listed in your patient instructions today. Your total calories consumed each day should not exceed 1,000-1,200 kcalories. 1. If you experience any new syptoms once you start this dietary approach, refer to your [facility]  Patient Manual for a list of all potential side effects of the LCD and recommendations for what to do to improve or resolved the negative side effect. For constipation, do not allow more than 3 days to pass you without having a bowel movement. As mentioned at your provider monthly visit, you can try taking OTC Magnesium 400 mg at bedtime for muscle cramping, difficulty sleeping or constipation or try Milk of Magnesia, Miralax, or Smooth Move Tea for constipation. If you have kidney disease, try OTC Colace instead. Please make sure you are consuming a minimal of 2 liter (67 oz) and up to half your body weight in ounces of water every day to reduce risk experiencing the potential negative side effects. 2. If you experience new dizziness and have consumed your proper water intake, you may drink one 8 oz coffee mug of broth or a bouillon cube in water. 3. Remember to get your labs drawn with your preferred laboratory one week prior to your 3rd monthly visit with your provider. 4. Attend the required nurse triage weigh-ins every other week at the office. Make sure homework sheets are completed prior to arrival or you will not be seen and cannot  meal products. 5. Have a reliable scale and try to use the same scale each time you weigh at home.  Best weights are yielded when you weight without clothing or shoes and measure before the first meal of the day. 6. Attend the weekly nutritional meetings on Thursdays with the dietician virtually as scheduled. 7.  If you plan to schedule your next 2 appointments with your provider using a virtual platform, please have a reliable blood pressure cuff and scale available. You will be asked to report your weight, blood pressure and heart rate at the time of checking in with the nurse that day. [address], , at [phone] with any questions or concerns related to the program.       Again, WELCOME TO THE [facility]! We are thrilled you have chosen us to take this weight loss journey with you and honored to do so. We look forward to working very closely with you as you achieve a healthier life. Many blessings! WEIGHT LOSS PLAN    1. Read food labels and count calories. Goal 3397-4463 calories daily for women and 9407-8579 calories daily for men. Achieve this by decreasing caloric intake by 500 calories by weekly increments. NOTE:  1 pound = 3500 calories! Www.loseit. LessonLab  Www.ResiModelnesspal.[email or web address]  Www.FX Aligned  Www.Localyte.com. gov  Weight watchers mobile    Calories needed to lose 1-2 pounds a week = 10 x your weight in pounds    2. Increase water intake. Per [facility], it is important to drink 1/2 your body weight in ounces of water daily. Decrease your water consumption 2-3 hours before bedtime to prevent sleep disturbance from frequent urination. 3.  Decrease sugary beverages. Each can or glass of soda increases your risk of obesity by 60%. Can lose 10 pounds in a month by avoiding any soda. 12 oz can of soda = 140 calories, 16 oz cup of sweet tea = 200 calories    16 oz orange juice = 200 calories, 10 oz apple juice = 150 calories   32 oz sports drink = 200 calories, 16 oz punch = 240 calories   3.5 oz alcohol = 100-150 calories     4. Avoid High Fructose Corn Syrup products. This ingredient makes products highly addictive! 5. Exercise 30 minutes daily 5 days weekly, minimally. If you burn 3500 calories that equals a pound! Use a pedometer to count steps. Visit www. HighFive Mobile for a free pedometer and diet recommendations. Your maximum heart rate = 220 - your age    Never exercise at your maximum heart rate. See handout for target heart rate. 6.  Decrease carbohydrates (white bread, pasta, rice, potatoes, sweet foods and sweet drinks like soda, tea, coffee, juice and sports drinks). Increase fiber and protein. Goal:   calories daily of carbohydrates     Try CHROMIUM PICONATE 200 MG THREE TIMES DAILY,    to decrease Premenstrual carbohydrate cravings. 7.  Eat 3-5 small meals daily, include lots of protein (beans/legumes, nuts, lean meat, eggs) and green vegetables with each. (Breakfast, lunch, dinner with 2 healthy snacks)    8. Get proper rest 7-8 hours uninterrupted. When you get less than 6 hours, it triggers hunger by affecting your [place]lin:Leptin ratio and this results in weight gain. 9.  Watch your food portions. Green leafy vegetables should cover 1/2 of the plate, lean meat 1/4 of the plate, starchy vegetable 1/4 of the plate. Use smaller plates. 10.  Do not eat until you are full. Eat until you are no longer hungry. If you are not sure, try drinking a glass of water before getting your second serving of food. 11.  Do not weigh yourself daily. Wait until your next office visit. Use how you feel and  how your clothes fit as measurements of success. 12.  Address your spirituality to draw strength from above during your journey. Remember \"I am fearfully and wonderfully made. Marvelous in His eyes. \"    13. Set realistic, appropriate and achievable weight loss goals:     RECOMMENDED TARGET WEIGHT LOSS:  Initial weight loss of 5-10% of your initial body weight achieved over 6 months or a decrease of 2 BMI units.      MINIMUM GOAL OF WEIGHT LOSS:  Reduce body weight and maintain a lower body weight. Prevent weight gain. RELATED WEBSITES:      Www.obesityaction. org  (and consider joining the Obesity Action Coalition for $25/year. The [address] mission is to elevated and empower those affected by obesity through education, advocacy and support. Quarterly journals included in membership fee. )    Www.Nextcar.com  www.SoNetJob.com     RELATED DIETS:  Dr. Cristela Iniguez Weight loss challenge, Mediterranean diet, [address] [facility] Watchers, [place] Diet (anti-inflammatory diet)    000

## 2021-07-12 NOTE — TELEPHONE ENCOUNTER
Two patient Identification confirmed. Patient notified about results  The kidney and liver tests are normal  The blood sugar test is normal  Your cholesterol numbers are not at goal. To provide you with the best heart health the LDL should be under 100 if you have no heart disease or history of diabetes. If you have a history of diabetes or heart disease the LDL goal should be less than 70. Avoid fried food, fast food and junk food. Also move more each day. aim for at least 150 minutes of activity each week. I want to recheck the cholesterol levels in three months   The thyroid test is normal  Patient verbalized understanding.

## 2021-07-14 ENCOUNTER — CLINICAL SUPPORT (OUTPATIENT)
Dept: SURGERY | Age: 26
End: 2021-07-14

## 2021-07-14 DIAGNOSIS — E66.09 CLASS 2 OBESITY DUE TO EXCESS CALORIES WITHOUT SERIOUS COMORBIDITY WITH BODY MASS INDEX (BMI) OF 35.0 TO 35.9 IN ADULT: Primary | ICD-10-CM

## 2021-07-26 ENCOUNTER — CLINICAL SUPPORT (OUTPATIENT)
Dept: SURGERY | Age: 26
End: 2021-07-26

## 2021-07-26 VITALS
WEIGHT: 238 LBS | BODY MASS INDEX: 35.15 KG/M2 | SYSTOLIC BLOOD PRESSURE: 122 MMHG | DIASTOLIC BLOOD PRESSURE: 64 MMHG | HEART RATE: 62 BPM

## 2021-07-26 DIAGNOSIS — E66.09 CLASS 2 OBESITY DUE TO EXCESS CALORIES WITHOUT SERIOUS COMORBIDITY WITH BODY MASS INDEX (BMI) OF 35.0 TO 35.9 IN ADULT: Primary | ICD-10-CM

## 2021-07-26 NOTE — PROGRESS NOTES
Progress Note: Weekly Education Class in the [facility]     1) Patient is on Very Low Calorie Diet [] (4 meal replacements per day, 800 kcal/day)      Low Calorie Diet [x] (2-3 meal replacements per day, 6788-4838 kcal/day)    Did patient have any new symptoms or physical problems? Yes []   or  No [x]    If yes, check & comment: weakness [], fatigue [], lightheadedness [], headache [], cramps [], cold intolerance [], hair loss [], diarrhea [], constipation [],  NA [] other:                                 Has patient had any medical attention from other providers, urgent care or the emergency room this week? Yes []  or No [x]       NA [], If yes, why:                                         2) Number of meal replacements consumed daily? 2 (range)  NA []    Did you eat any food outside of the program? Yes [x] No []    3) Average ounces of water patient consumed daily this week (not including shakes)? 60     (divide the weekly total by 7)    Any other sugar sweetened beverages consumed this week? Yes []  No [x]    Did patient have any problems adhering to the diet? Yes [x]  No [] NA []    If yes, Vacation [], Celebrations [], Conferences [], Family Reunions [] other: Eating 1400 instead of 1200, Not enough meal replacements                                               4) How has patient mood overall been this week? Sad [], Happy [], Stressed [], Tired [], Content [x], NA [], other            5) Physical Activity Over the Past Week:    Cardio exercise: 20 min  Strength exercise: 0 workouts / week  Number of steps walked per day: n/a      Medications reconciled by nurse Yes [x]  No[]    Patient was given therapeutic recommendations for any noted side effects of their dietary approach based upon [facility] patient manual per providers recommendation.

## 2021-07-27 NOTE — PROGRESS NOTES
Metabolic Program Follow-up Nutrition Consult    Date: 2021  Physician: Rose Lr DO  Name: Luann Presley  :  1995    Type of Plan: LCD  Weeks on Plan: 3 weeks  Virtual visit was completed through [address] [place] [address]. ASSESSMENT:    Medications/Supplements:   Prior to Admission medications    Medication Sig Start Date End Date Taking? Authorizing Provider   B.infantis-B.ani-B.long-B.bifi (Probiotic 4X) 10-15 mg TbEC Take  by mouth. Provider, Historical   pantoprazole (PROTONIX) 40 mg tablet Take 40 mg by mouth daily. 21   Provider, Historical   escitalopram oxalate (LEXAPRO) 10 mg tablet Take 10 mg by mouth daily. 21   Provider, Historical   zolpidem CR (AMBIEN CR) 12.5 mg tablet Take 12.5 mg by mouth nightly. 5/10/21   Provider, Historical   MEN'S MULTI-VITAMIN PO Take 1 Tablet by mouth daily. Provider, Historical   omega 3-dha-epa-fish oil (Fish Oil) 100-160-1,000 mg cap Take 2,000 mg by mouth daily. Provider, Historical   cholecalciferol (Vitamin D3) 25 mcg (1,000 unit) cap Take 1,000 Units by mouth daily. Provider, Historical   magnesium 250 mg tab Take 1 Tablet by mouth daily. Provider, Historical   melatonin 5 mg cap capsule Take 5 mg by mouth nightly. Provider, Historical       Starting program weight: 258#  Current weight: 239#     BMI:35    Category: Obesity Class II    Exercise/Physical Activity:none reported    Is patient using [employer]irections products:yes  If yes, how many per day:2-3    Aversions/side effects of product/program: none reported    Reported Diet History: restrictive pallet, only consuming chicken tenders or yogurt with blueberries most days for the meals, otherwise consuming bars or shakes. Patient reports he has a \"childlike\" eating habit. NUTRITION INTERVENTION:  Pt educated on nutrition recommendations for the [employer]irection Low Calorie Plan (LCD), with the specific meal pattern of 2 meal replacements every day plus a meal and snack.     Grocery meal:  Use the balanced plate method to plan meals, include 3-6 oz of lean source of protein, unlimited non-starchy vegetables, 1/2 cup whole grains/beans OR 1/2 cup fruit OR 1 serving of low fat dairy. Utilize handouts listing healthy snack and meal ideas. Read all nutrition labels. Demonstrated and emphasized identifying serving size, total fat, sugar and protein content. Defined low fat as </= 3 g per serving. Discussed lean and extra lean sources of protein. Avoid foods with sugar listed in the first 3 ingredients and >/10 g sugar per serving. Consume meal replacements every three to four hours or pattern as discussed with provider. Mix with water. May add herbs/spices for taste. Practice mindful eating habits; take small bites, chew thoroughly, avoid distractions, utilize hunger/fullness scale. Reviewed attendance policy of attending weekly nutrition classes. Metabolic support group recommended, and increase physical activity (approved per MD) for long term weight maintenance. NUTRITION MONITORING AND EVALUATION: continue to encourage patient to increase exposure to new foods. Pt was to try lobster or shrimp and asparagus on vacation, did not try any of those. Setting very small, attainable goals at each visit, just trying one or two new foods by next visit. Will continue to guide patient about what a healthy lifestyle consists with food, especially with his goal of becoming a weight . Pt continues to lose weight, reading labels and staying within 1000-1200kcal range. The following goals were established with patient;  1) try asparagus, may put parm cheese on it to increase desire to eat  2) try grapes  3) start virtual reality boxing game for exercise    Specific tips and techniques to facilitate compliance with above recommendations were provided and discussed. If further details are desired please contact me at [phone].   This phone number was also provided to the patient for any further questions or concerns.           Flako Lopez, MS, RD, LDN

## 2021-07-31 PROBLEM — R63.4 WEIGHT LOSS: Status: ACTIVE | Noted: 2021-07-31

## 2021-07-31 PROBLEM — Z83.49 FAMILY HISTORY OF HYPERTHYROIDISM: Status: ACTIVE | Noted: 2021-07-31

## 2021-07-31 PROBLEM — Z82.62: Status: ACTIVE | Noted: 2021-07-31

## 2021-07-31 PROBLEM — E78.00 HIGH CHOLESTEROL: Status: ACTIVE | Noted: 2021-07-31

## 2021-07-31 PROBLEM — E66.09 CLASS 2 OBESITY DUE TO EXCESS CALORIES WITHOUT SERIOUS COMORBIDITY WITH BODY MASS INDEX (BMI) OF 35.0 TO 35.9 IN ADULT: Status: ACTIVE | Noted: 2021-07-31

## 2021-07-31 PROBLEM — Z82.3 FAMILY HISTORY OF STROKE: Status: ACTIVE | Noted: 2021-07-31

## 2021-07-31 PROBLEM — F41.1 GENERALIZED ANXIETY DISORDER: Status: ACTIVE | Noted: 2021-07-31

## 2021-07-31 PROBLEM — Z82.49 FAMILY HISTORY OF ABDOMINAL AORTIC ANEURYSM (AAA): Status: ACTIVE | Noted: 2021-07-31

## 2021-07-31 PROBLEM — F51.01 PRIMARY INSOMNIA: Status: ACTIVE | Noted: 2021-07-31

## 2021-07-31 PROBLEM — Z80.7 FAMILY HISTORY OF NON-HODGKIN'S LYMPHOMA: Status: ACTIVE | Noted: 2021-07-31

## 2021-07-31 PROBLEM — E55.9 VITAMIN D DEFICIENCY: Status: ACTIVE | Noted: 2021-07-31

## 2021-07-31 NOTE — PROGRESS NOTES
[address] [facility], [address], [place]  [address].  [phone] o  [phone] f    PHYSICIAN FOLLOW UP NOTE  Method of Delivery:   Face to Face    PCP:  Opal Fitzgerald MD      HISTORY OF PRESENT ILLNESS  Agree with nurse and registered dietician notes. Familia Coreas is a 30 y.o. male with Obesity Class 2 Body mass index is 35.29 kg/m². and associated health concerns presents for Weight Management    Per chart review, Mr. Waldo castañeda has Asperger's Syndrome. He is attending his appointment alone today. Questions must be asked multiple times to get a clear understanding of his response. Per chart review, patient has been participating in the [employer] Management Program using the [employer] New Direction Low Calorie Diet (LCD, 3079-7786 kcal/day) since 06/03/21 with Dr. Adelina Trent who works on Thursdays in [place] [address]. Thursdays are no longer convenient for patient because he will be starting a new job soon on [address] [place] [address]. Dr. Elaine Angel reportedly has had the patient seeing any available provider in our Center instead of a nurse for his bi-monthly evaluations. Anthropometric measures on start date:  Weight 258 pounds, BMI 38.10 kg/m2, Neck circumference 17.7 inches, Waist circumference 50.5 inches, Body fat percentage 31.7 %. Patient goals for participation in this weight management program:   228 lbs in 4 months (by 10/2021)    Current Anthropometric Measures  Weight today:  239 lbs, BMI 35.29 kg/m2    Weight loss of 8 pounds since last appointment in our Center. Contributing factors to weight loss:  LCD  Total pounds loss since starting this therapeutic approach: 19 lbs. Is patient satisfied with his/her progress since the last appointment? Yes. .    What makes it difficult to adhere to this plan of care:  Pt was recently on vacation visiting [place] in [place], [place] x 4 days. He states, \"I don't know what I ate. I ate whatever was around when I was hungry. I got back on track on Saturday when I returned home. \"    Eating Habits  Total calories consumed per day:  7875-7408 kcal \"like I am supposed to do\"      Number of Meal replacements consumed per day:  2-3 [employer] New Direction food products  Negative Side Effects:  0     Typical Meals:        Breakfast: yogurt w blueberries       Lunch:  ND shake every 4-5 hours       Dinner: ND shake      Snacks: ND shake \"It's a pain to count. \"    Barriers to eating meals:  none  Does patient want to continue use of New Direction meal replacements:  yes    Drinking Habits  Average amount of water consumed daily: 3 20 oz/day w Skittles flavoring packet  (Goal 2 L or 67 oz daily, minimally)  Amount of caffeine consumed daily: 0 oz \"caffeing makes my throat tighter. \"   Sugar-sweetened beverages consumed daily (including alcohol, sodas, teas, juices, etc.): 2-3 beer or 1 glass wine 2 times monthly on weekends     Social History     Tobacco Use    Smoking status: Never Smoker    Smokeless tobacco: Never Used   Vaping Use    Vaping Use: Never used   Substance Use Topics    Alcohol use: Yes     Comment: weekends, socially    Drug use: Not Currently         Sleep Habits  Total hours of sleep nightly: 8-10 hours (Goal 7-8 hours/nightly)- depends, \"random\", \"I don't know\"  Rx or OTC Sleep Aids:  Ambien, Melatonin  Occupation:  Unemployed, starting a new internship soon   Do you work night shift: no  Personal or family history of Sleep Apnea:  No, had negative Sleep Study results in 2019 although he snores CPAP use:  no  Sleep Specialist:  no  Barriers to getting proper rest:  I dont know     Physical Activity History  Type of physical activity: lots of swimming at the beach, treadmill virtual reality trip at home next week  Physical activity is performed daily  a week for ? minutes 2-3 times in the day  Enjoyment with physical activity:  yes    Pedometer or fitness tracker use: ?    Average number steps per day:  ?,000 steps/day  Pets owned:  0    Barriers limiting physical activity:  0    Mobile Apps used for meal planning, calorie counting, water consumption, sleep, or physical activity:  0     Weight Loss Medication History  Current weight loss medication:  0       Outpatient Medications Marked as Taking for the 7/12/21 encounter (Office Visit) with Kennon Felty, DO   Medication Sig Dispense Refill    B.infantis-B.ani-B.long-B.bifi (Probiotic 4X) 10-15 mg TbEC Take  by mouth.  pantoprazole (PROTONIX) 40 mg tablet Take 40 mg by mouth daily.  escitalopram oxalate (LEXAPRO) 10 mg tablet Take 10 mg by mouth daily.  zolpidem CR (AMBIEN CR) 12.5 mg tablet Take 12.5 mg by mouth nightly.  MEN'S MULTI-VITAMIN PO Take 1 Tablet by mouth daily.  omega 3-dha-epa-fish oil (Fish Oil) 100-160-1,000 mg cap Take 2,000 mg by mouth daily.  cholecalciferol (Vitamin D3) 25 mcg (1,000 unit) cap Take 1,000 Units by mouth daily.  magnesium 250 mg tab Take 1 Tablet by mouth daily.  melatonin 5 mg cap capsule Take 5 mg by mouth nightly. Medications that cause weight gain:  Lexapro, Protonix  Medications that cause weight loss:  0  Changes to medications since last office visit with me:  0    Allergies   Allergen Reactions    Caffeine Other (comments)     THROAT TIGHTENING    Suprax [Cefixime] Unknown (comments)     As a child       Surgical History  Previous Weight Loss Surgery:  0 Date of Surgery and Surgeon:  na    History reviewed. No pertinent surgical history. 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 Abi Kaplan, psychiatrist - Lexapro  Is it controlled: mostly    Do you have any current major lifestyle changes or stressors:   Completing the [facility] [facility] homework sheets is very stressful. \"I can't get motivated to do it. \"  Pt refers to it as a \"chore. 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 Other Medical Care Since Last Office Visit  Per chart review, [facility] Pediatric Geneticist, Dr. Mel Chery 05/21/15 provided great insight to the patients personality, answers and disposition today. He has been diagnosed with Asperger's Syndrome, Anxiety , ADD, Sensory Integration Disorder, Patellar Dislocation and GERD.      Associated Medical Conditions    Past Medical History:   Diagnosis Date    ADD (attention deficit disorder) 05/21/2015    Anxiety 05/21/2015    Dr. Charbel Kauffman Asperger's syndrome 05/21/2015    Chronic insomnia     Patellar dislocation     Left    LIDIA (sensory integration disorder) 05/21/2015    Dr. Mel Chery at [facility] genetics       Family History   Problem Relation Age of Onset    Cancer Father         non-Hodgkin's Lymphoma    Dementia Father     Stroke Maternal Grandmother 80    Osteoporosis Mother     Thyroid Disease Mother     No Known Problems Maternal Grandfather     No Known Problems Paternal Grandmother     No Known Problems Paternal Grandfather         gun shot wound    Obesity Maternal Aunt     Other Maternal Aunt         mood swings, sensory overload    Obesity Half-sibling         ALL ARE OBESE         OB/GYN History (For Female Patients)  Social History     Substance and Sexual Activity   Sexual Activity Not Currently    Partners: Female    Birth control/protection: Abstinence      Do you have upcoming travel in the next 6 weeks:  no. Patient will also notify the dietician for recommendations during travel. Immunization History   Administered Date(s) Administered    COVID-19, PFIZER, MRNA, LNP-S, PF, 30MCG/0.3ML DOSE 03/12/2021, 04/09/2021       Health Maintenance   A1c:  SEE RESULTS BELOW     Lipids:  SEE RESULTS BELOW  Depression Screening:  Performed during Initial Visit to [facility]  Colonoscopy:  na  Mammogram:  na  Annual Wellness Exam:  To be performed by PCP, when appropriate. ROS    Pt denies hunger, cravings, lack of focus, fatigue, feeling weak, headaches, dizziness, light headedness, nausea, vomiting, diarrhea, constipation, indigestion, rapid heart rate, SOB, low blood sugar, feeling cold, hair loss, rash, fluid retention, leg aches, difficulty sleeping, irritability, mood swings, or other associated symptoms. Review of Systems negative except as noted above in HPI. PHYSICAL EXAMINATION    Visit Vitals  /76 (BP 1 Location: Left arm, BP Patient Position: Sitting)   Pulse 69   Temp 98.7 °F (37.1 °C) (Temporal)   Resp 18   Ht 5' 9\" (1.753 m)   Wt 239 lb (108.4 kg)   SpO2 98%   BMI 35.29 kg/m²           Weight Metrics 7/26/2021 7/12/2021 7/12/2021 6/22/2021 6/22/2021 6/10/2021 6/10/2021   Weight 238 lb - 239 lb - 247 lb 12.8 oz - 251 lb 4.8 oz   Neck Circ (inches) - - - 17 - 17.5 -   Waist Measure Inches - - - 49.5 - 49.5 -   Body Fat % - 29.5 - 31 - 31.7 -   BMI 35.15 kg/m2 - 35.29 kg/m2 - 36.59 kg/m2 - 37.11 kg/m2          Neck Circumference:  Acceptable range for M >16 inches, F>15 inches  Waist Circumference:  Acceptable range for M> 40 inches/102 cm, F > 35 inches, 88 cm  Body Fat: Acceptable range for M 18-24%, F 25-31%    General appearance - Well nourished. Well appearing. Well developed. No acute distress. Obese. Head - Normocephalic. Atraumatic. Eyes - pupils equal and reactive. Extraocular eye movements intact. Sclera anicteric. Mildly injected sclera. Ears - Hearing is grossly normal bilaterally. Nose - normal and patent. No polyps noted. No erythema. No discharge. Mouth - mucous membranes with adequate moisture. Posterior pharynx normal with cobblestone appearance. No erythema, white exudate or obstruction. Neck - supple. Midline trachea. No carotid bruits noted bilaterally. No thyromegaly noted. Chest - clear to auscultation bilaterally anteriorly and posteriorly. No wheezes. No rales or rhonchi. Breath sounds are symmetrical bilaterally. Unlabored respirations. Heart - normal rate. Regular rhythm. Normal S1, S2. No murmur noted. No rubs, clicks or gallops noted. Abdomen - soft and distended. No masses or organomegaly. No rebound, rigidity or guarding. Bowel sounds normal x 4 quadrants. No tenderness noted. Neurological - awake, alert and oriented to person, place, and time and event. Cranial nerves II through XII intact. Clear speech. Muscle strength is +5/5 x 4 extremities. Sensation is intact to light touch bilaterally. Steady gait. Heme/Lymph - peripheral pulses normal x 4 extremities. No peripheral edema is noted. Musculoskeletal - Intact x 4 extremities. Full ROM x 4 extremities. No pain with movement. Back exam - normal range of motion. No pain on palpation of the spinous processes in the cervical, thoracic, lumbar, sacral regions. No CVA tenderness. No buffalo hump noted. Skin - no rashes, erythema, ecchymosis, lacerations, abrasions, suspicious moles noted. No skin tags or moles. No acanthosis nigricans noted in the axilla or neck. Psychological -   abnormal behavior, normal dress and hought processes. occ adequate insight. Good eye contact. Flat affect. Appropriate mood. Normal speech.       DATA REVIEWED    No results found for: WBC, WBCLT, HGBPOC, HGB, HGBP, HCTPOC, HCT, PHCT, RBCH, PLT, MCV, HGBEXT, HCTEXT, PLTEXT  Lab Results   Component Value Date/Time    Sodium 140 07/03/2021 08:13 AM    Potassium 5.0 07/03/2021 08:13 AM    Chloride 102 07/03/2021 08:13 AM    CO2 26 07/03/2021 08:13 AM    Glucose 90 07/03/2021 08:13 AM    BUN 19 07/03/2021 08:13 AM    Creatinine 0.98 07/03/2021 08:13 AM    BUN/Creatinine ratio 19 07/03/2021 08:13 AM    GFR est  07/03/2021 08:13 AM    GFR est non- 07/03/2021 08:13 AM    Calcium 10.0 07/03/2021 08:13 AM    Bilirubin, total 0.5 07/03/2021 08:13 AM    Alk. phosphatase 74 07/03/2021 08:13 AM    Protein, total 6.7 07/03/2021 08:13 AM    Albumin 4.6 07/03/2021 08:13 AM    A-G Ratio 2.2 07/03/2021 08:13 AM    ALT (SGPT) 35 07/03/2021 08:13 AM    AST (SGOT) 24 07/03/2021 08:13 AM     Lab Results   Component Value Date/Time    Hemoglobin A1c 4.9 07/03/2021 08:13 AM     Lab Results   Component Value Date/Time    TSH 1.000 07/03/2021 08:13 AM     No results found for: URICA, UAU1  No results found for: MG  No results found for: B12LT, FOL, RBCF  No results found for: VITD3, XQVID2, XQVID3, XQVID, VD3RIA, IHRP47IVDLL  No results found for: IRON, FE, TIBC, IBCT, PSAT, FERR  Lab Results   Component Value Date/Time    Cholesterol, total 197 07/03/2021 08:13 AM    HDL Cholesterol 36 (L) 07/03/2021 08:13 AM    LDL, calculated 131 (H) 07/03/2021 08:13 AM    VLDL, calculated 30 07/03/2021 08:13 AM    Triglyceride 169 (H) 07/03/2021 08:13 AM     EKG:  None found in EMR      ASSESSMENT     ICD-10-CM ICD-9-CM    1. Class 2 obesity due to excess calories without serious comorbidity with body mass index (BMI) of 35.0 to 35.9 in adult  E66.09 278.00     Z68.35 V85.35    2. Weight loss  R63.4 783.21     8# since 2 weeks ago due to LCD   3. Generalized anxiety disorder  F41.1 300.02     stable on Lexapro 10 mg daily   4. Asperger's syndrome  F84.5 299.80    5. Vitamin D deficiency  E55.9 268.9    6. Primary insomnia  F51.01 307.42     stable on Ambien    7. LIDIA (sensory integration disorder)  F88 315.8    8. Attention deficit disorder (ADD) without hyperactivity  F98.8 314.00    9. High cholesterol  E78.00 272.0    10.  Family history of hyperthyroidism  Z83.49 V18.19    11. Family history of disuse osteoporosis  Z82.62 V17.81    12. Family history of non-Hodgkin's lymphoma  Z80.7 V16.7    13. Family history of abdominal aortic aneurysm (AAA)  Z82.49 V17.49    15. Family history of stroke  Z82.3 V17.1    15. BMI 35.0-35.9,adult  Z68.35 V85.35        We discussed the expected course, resolution and complications of the diagnosis(es) in detail. WEIGHT MANAGEMENT PLAN    Chart was reviewed and updated during the office visit today. Marily Garcia has fulfilled [facility] program requirements this month by completing homework forms, attending educational classes, nurse triage visits and monthly provider appointments as agreed and remains in good standing. Reviewed and appreciate registered dietician note for nutritional counseling. Patient has attended all requirements of the program over the past month and is in good standing. Reviewed and appreciate weekly/biweekly nurse visits and agree with documentation. Followed up on any patient concerns today. Emphasized the importance of the patient continuing care from current primary care provider and other specialists while participating in this weight management program.  Patient has full access to all our office notes, orders, lab results on [employer] and can provide them copies, if desired. Advised patient to follow up with pcp for any acute symptoms and Health Maintenance. Reviewed office notes from other health care providers:  Dr. Negro Bee, Dr. Sallie Prince, Dr. Monserrat Simpson. Continue current medications as directed by prescribers. Identified and discussed medications the patient is taking that can cause weight gain or weight loss as recorded on patient's medication list.  Advised patient not to stop use of any without discussing with the prescribing provider. Recommended patient to ask prescribing providers to consider more weight neutral or weight negative options.  Will monitor patient's weight and health with continued use of current medications. Supplement recommendations: Take OTC Magnesium 400 mg po at night prn sleep, muscle cramping, constipation. Take OTC vit B12 1000 mcg daily orally if taking Metformin or experiencing numbness and tingling. Take OTC Fish Oil 1000 mg with EPA and -1,000 mg daily if experiencing dry skin, low HDL. Use with caution if history of heartburn exists. Increase fiber products (I.e. fiber tea, fiber shakes) or try OTC Fiber products (I.e. Benefiber, Metamucil, psyllium, etc) if experiencing constipation. Take OTC Lactaid with meal replacements, if lactose intolerance history exists or symptoms begin. Refer to [facility] Patient Manual for additional anecdotal considerations to possible dietary side effects. If patient has had bariatric surgery, Katelyn Rios was advised to take the vitamin regimen and follow dietary recommendations as directed by He bariatric surgical team.  Get annual labs to re-evaluate vitamin levels. Avoid NSAIDS. Reviewed and discussed most recent lab results and EKG results. If not available today, advised patient to sign a release to provide our program a copy of each. Informed patient that an EKG will be performed for every 50 lbs of weight loss. Recheck pertinent labs every 3 months while participating in LCD using New Direction meal replacements, as discussed to identify electrolyte abnormalities and guide therapeutic approach. An order form for pertinent labs was given to patient today. Patient was advised to have the labwork performed 1 week prior to that appointment with me. Advised patient to sign up for [facility] and view lab results directly. Notify me by [facility] if any questions or concerns arise. Labs ordered today will be reviewed in detail at the next office visit and time allowed for any questions regarding the results.     Discussed the patient's BMI, anthropometric measures and goals with patient. The weight management follow up plan is as follows:     Dietary Prescription:  Recommended meal plan:  New Direction Low Calorie Dietary approach   Make sure proper daily calories are consumed for each dietary approach: LCD 0552-2489 kcal/day, 2-3 meal replacements daily and 1 \"grocery\" meal.   Encouraged patient to stay within the recommended amount of calories per day   Reviewed nutrition and importance of regular protein intake and minimizing hidden carbohydrate sources. Decrease simple carbohydrates in any regular meal allowed (white foods, sweet foods, sweet drinks and alcohol), increase green leafy vegetables and protein (lean meats and beans) with each meal.  Avoid fried foods and limit saturated fats. Do not skip meals. Consume a minimum of 2 L (67 oz) of water daily up to half your body weight in ounces of water while utilizing this dietary approach. Avoid sugar-sweetened beverages. Limit caffeine, will allow 1 8 oz cup of black coffee or green tea (to stimulate release of Adiponectin and promote fat burning.)    Referred patient to New Direction Patient Manual for recommended antidotes, if any new symptoms or side effects have been experienced once dietary approach is initiated. Advised patient to notify our office if this occurs. Exercise Prescription: Advised minimal, gradual increase and as tolerated. If already exercising, reduce it to half the time and intensity while determining the level of tolerance for the amount of caloric intake. A goal of 30 minutes physical activity daily is recommended for health benefit and at least 60 minutes daily to prevent weight regain. During weight loss phase, no less than 75% of this time needs to be performing aerobic (i.e. Walking) activity with no more than 25% of the time performing resistance exercises (i.e. Weight lifting, strengthening, toning).   During weight maintenance phase, 50% of the physical activity time needs to be spent performing aerobic activity with 50% of the total time performing resistance exercises. Emphasized importance of physical activity and reducing sedentary time. Sleep Prescription: A goal of 7-9 hours nightly of uninterrupted sleep is recommended to turn off the Grehlin hormone to be released from the stomach and triggers appetite while promoting weight gain. Proper rest turns on Leptin hormone to be released from white adipose tissue and promotes weight loss. Discussed snoring, symptoms of Sleep Apnea and improvements with weight loss. Strongly encouraged compliance with CPAP, if Sleep Apnea has been diagnosed. Advised patient to follow up with sleep specialist for every 25 pounds lost to see if CPAP settings need to be adjusted. Avoid caffeine 6-12 hours before bedtime. Counseled patient on health topics:  Obesity, Insulin Resistance, LCD dietary approaches, benefits, contraindications, possible side effects to these diets and how to prevent poor outcomes (including staying hydrated, limit physical exercise while transitioning into this program, avoid skipping meals, etc), weight loss goals, sleep hygiene, barriers to losing weight and keeping weight off, strategies to overcome habits or challenges to approve or continue adherence and stressors. Informed patient that weight loss goal of 5-10% in 6-12 months has shown significant improvement in obesity and its related health conditions including DM, heart disease, asthma. A key study published in Arthritis & Rheumatism of Overweight and Obese Adults with OA found that losing 1 pound of weight resulted in 4 pounds of pressure being removed from the knees. Reminded patients who are female gender and of reproductive age that with weight loss, they may become more fertile. Recommended the use of condoms or some reliable form of contraception if pregnancy is not desired.   Notify our office immediately if patient becomes pregnant. Immunizations noted. Covid vaccines recommended, if patient has not had it. Obesity may increase risk for severe illness and death from Covid-19 disease. Offered empathy, encouragement, legitimation, prayers, partnership to patient. Praised patient for successes. Patient was offered a choice/choices in the treatment plan today. Patient expressed understanding with the diagnoses and the plan and agrees with recommendations. Since patient has been very compliant to weekly and monthly office visits, showing improvement in weight, neck circumference, waist circumference and bod fat, will allow him to see the nurse bi-monthly and me monthly. Follow-up and Dispositions    · Return in about 1 month (around 8/12/2021) for LCD. Total time:  80 mins spent in counseling, coordinating care, discussing results, reviewing relevant documentation from other providers, completing relevant documentation, and discussing treatment plans in reference to The primary encounter diagnosis was Class 2 obesity due to excess calories without serious comorbidity with body mass index (BMI) of 35.0 to 35.9 in adult. Diagnoses of Weight loss, Primary insomnia, Asperger's syndrome, Vitamin D deficiency, Anxiety and depression, LIDIA (sensory integration disorder), and Attention deficit disorder (ADD) without hyperactivity were also pertinent to this visit. [address], MD FOR ALLOWING ME THE PRIVILEGE TO PARTICIPATE IN THE CARE OF OUR MUTUAL PATIENT, Mr. Evelin Zee. John Castaneda DO, Diplomate [employer]    Patient Instructions   . [id number]      Congratulations on starting the Low Calorie Diet! Consume 2-3 meal replacements a day and 1 \"grocery\" meal.  The \"grocery\" meal should primarily consist of protein and green vegetables.     Please meet with the dietician to learn how to calculate your total calories daily and/or use one of the suggested Mobile Apps listed in your patient instructions today. Your total calories consumed each day should not exceed 1,000-1,200 kcalories. 1. If you experience any new syptoms once you start this dietary approach, refer to your [employer]irection Program  Patient Manual for a list of all potential side effects of the LCD and recommendations for what to do to improve or resolved the negative side effect. For constipation, do not allow more than 3 days to pass you without having a bowel movement. As mentioned at your provider monthly visit, you can try taking OTC Magnesium 400 mg at bedtime for muscle cramping, difficulty sleeping or constipation or try Milk of Magnesia, Miralax, or Smooth Move Tea for constipation. If you have kidney disease, try OTC Colace instead. Please make sure you are consuming a minimal of 2 liter (67 oz) and up to half your body weight in ounces of water every day to reduce risk experiencing the potential negative side effects. 2. If you experience new dizziness and have consumed your proper water intake, you may drink one 8 oz coffee mug of broth or a bouillon cube in water. 3. Remember to get your labs drawn with your preferred laboratory one week prior to your 3rd monthly visit with your provider. 4. Attend the required nurse triage weigh-ins every other week at the office. Make sure homework sheets are completed prior to arrival or you will not be seen and cannot  meal products. 5. Have a reliable scale and try to use the same scale each time you weigh at home. Best weights are yielded when you weight without clothing or shoes and measure before the first meal of the day. 6. Attend the weekly nutritional meetings on Thursdays with the dietician virtually as scheduled. 7.  If you plan to schedule your next 2 appointments with your provider using a virtual platform, please have a reliable blood pressure cuff and scale available.   You will be asked to report your weight, blood pressure and heart rate at the time of checking in with the nurse that day. [address], , at [phone] with any questions or concerns related to the program.       Again, WELCOME TO THE [facility] [place]! We are thrilled you have chosen us to take this weight loss journey with you and honored to do so. We look forward to working very closely with you as you achieve a healthier life. Many blessings! WEIGHT LOSS PLAN    1. Read food labels and count calories. Goal 8662-9039 calories daily for women and 4352-6209 calories daily for men. Achieve this by decreasing caloric intake by 500 calories by weekly increments. NOTE:  1 pound = 3500 calories! Www.Review Trackersit. ProtAb  Www.[employer] Fundpal.ProtAb  Www.Swivl  Www.[employer]. gov  Weight watchers mobile    Calories needed to lose 1-2 pounds a week = 10 x your weight in pounds    2. Increase water intake. Per [facility] Weight loss Program, it is important to drink 1/2 your body weight in ounces of water daily. Decrease your water consumption 2-3 hours before bedtime to prevent sleep disturbance from frequent urination. 3.  Decrease sugary beverages. Each can or glass of soda increases your risk of obesity by 60%. Can lose 10 pounds in a month by avoiding any soda. 12 oz can of soda = 140 calories, 16 oz cup of sweet tea = 200 calories    16 oz orange juice = 200 calories, 10 oz apple juice = 150 calories   32 oz sports drink = 200 calories, 16 oz punch = 240 calories   3.5 oz alcohol = 100-150 calories     4. Avoid High Fructose Corn Syrup products. This ingredient makes products highly addictive! 5. Exercise 30 minutes daily 5 days weekly, minimally. If you burn 3500 calories that equals a pound! Use a pedometer to count steps. Visit www. Net Zero AquaLife. ProtAb for a free pedometer and diet recommendations.       Your maximum heart rate = 220 - your age    Never exercise at your maximum heart rate. See handout for target heart rate. 6.  Decrease carbohydrates (white bread, pasta, rice, potatoes, sweet foods and sweet drinks like soda, tea, coffee, juice and sports drinks). Increase fiber and protein. Goal:   calories daily of carbohydrates     Try CHROMIUM PICONATE 200 MG THREE TIMES DAILY,    to decrease Premenstrual carbohydrate cravings. 7.  Eat 3-5 small meals daily, include lots of protein (beans/legumes, nuts, lean meat, eggs) and green vegetables with each. (Breakfast, lunch, dinner with 2 healthy snacks)    8. Get proper rest 7-8 hours uninterrupted. When you get less than 6 hours, it triggers hunger by affecting your Grehlin:Leptin ratio and this results in weight gain. 9.  Watch your food portions. Green leafy vegetables should cover 1/2 of the plate, lean meat 1/4 of the plate, starchy vegetable 1/4 of the plate. Use smaller plates. 10.  Do not eat until you are full. Eat until you are no longer hungry. If you are not sure, try drinking a glass of water before getting your second serving of food. 11.  Do not weigh yourself daily. Wait until your next office visit. Use how you feel and  how your clothes fit as measurements of success. 12.  Address your spirituality to draw strength from above during your journey. Remember \"I am fearfully and wonderfully made. Marvelous in His eyes. \"    13. Set realistic, appropriate and achievable weight loss goals:     RECOMMENDED TARGET WEIGHT LOSS:  Initial weight loss of 5-10% of your initial body weight achieved over 6 months or a decrease of 2 BMI units. MINIMUM GOAL OF WEIGHT LOSS:  Reduce body weight and maintain a lower body weight. Prevent weight gain. RELATED WEBSITES:      Www.obesityaction. org  (and consider joining the Obesity Action Coalition for $25/year. The [facility] mission is to elevated and empower those affected by obesity through education, advocacy and support. Quarterly journals included in membership fee. )    Www.Casinity. Yassets  www.[employer] Network.com     RELATED DIETS:  Dr. Christian Carvalho Weight loss challenge, Mediterranean diet, [facility] Watchers, Paleo Diet (anti-inflammatory diet)    000

## 2021-08-02 NOTE — PROGRESS NOTES
Carli Bender presents for weekly or bi-monthly evaluation of Obesity Body mass index is 35.15 kg/m². Visit Vitals  /64 (BP 1 Location: Left arm, BP Patient Position: Sitting)   Pulse 62   Wt 238 lb (108 kg)   BMI 35.15 kg/m²       Wt Readings from Last 3 Encounters:   07/26/21 238 lb (108 kg)   07/12/21 239 lb (108.4 kg)   06/22/21 247 lb 12.8 oz (112.4 kg)       1. Class 2 obesity due to excess calories without serious comorbidity with body mass index (BMI) of 35.0 to 35.9 in adult         I have reviewed and agree with nurse documentation of Weekly Education Class nurse progress note for [employer] Weight [address] [place] [address] IN RED [place]). Carli Bender is compliant with program requirements and may continue participation utilizing the New Direction meal replacements, as discussed. Patient has been advised to refer to the [facility] Patient Manual and notify us if any side effects to this meal plan are present and/or persist.  Carli Bender may continue the current dietary approach, care and follow up at the monthly office visit with the provider, as scheduled. Follow-up and Dispositions    · Return in about 2 weeks (around 8/9/2021) for weight, LCD. Documentation true and accepted by Danette Moses.  Rainer Beltrán., AOBFP, Diplomate SUBHASH REYNOLDS

## 2021-08-04 ENCOUNTER — CLINICAL SUPPORT (OUTPATIENT)
Dept: SURGERY | Age: 26
End: 2021-08-04

## 2021-08-04 DIAGNOSIS — E66.9 OBESITY (BMI 30.0-34.9): Primary | ICD-10-CM

## 2021-08-04 NOTE — PROGRESS NOTES
[facility]  Metabolic Program Follow-up Nutrition Consult    Date: 2021   Physician: Irma Weathers DO  Name: Casper Daniels  :  1995    Type of Plan: LCD  Weeks on Plan: 2 months  Virtual visit was completed through [employer]. ASSESSMENT:    Medications/Supplements:   Prior to Admission medications    Medication Sig Start Date End Date Taking? Authorizing Provider   B.infantis-B.ani-B.long-B.bifi (Probiotic 4X) 10-15 mg TbEC Take  by mouth. Provider, Historical   pantoprazole (PROTONIX) 40 mg tablet Take 40 mg by mouth daily. 21   Provider, Historical   escitalopram oxalate (LEXAPRO) 10 mg tablet Take 10 mg by mouth daily. 21   Provider, Historical   zolpidem CR (AMBIEN CR) 12.5 mg tablet Take 12.5 mg by mouth nightly. 5/10/21   Provider, Historical   MEN'S MULTI-VITAMIN PO Take 1 Tablet by mouth daily. Provider, Historical   omega 3-dha-epa-fish oil (Fish Oil) 100-160-1,000 mg cap Take 2,000 mg by mouth daily. Provider, Historical   cholecalciferol (Vitamin D3) 25 mcg (1,000 unit) cap Take 1,000 Units by mouth daily. Provider, Historical   magnesium 250 mg tab Take 1 Tablet by mouth daily. Provider, Historical   melatonin 5 mg cap capsule Take 5 mg by mouth nightly. Provider, Historical              Starting Weight: 258#  Current Weight: 238#  Overall Pounds Lost: 20# Overall Pounds Gained: 0    Self report weight today: 234#    Exercise/Physical Activity: only boxed once since last visit. Plans to do 10 min boxing virtual reality game and 20 min of less intensive game few days per week. Is patient using New Directions products: yes. If yes, how many per day:2-3    Aversions/side effects of product/program: none noted    Fluids used to mix with products:water    Reported Diet History:having trouble sticking to program, was previously counting calories, sticking to 2554-9547 kcals every day. Missing certain foods.   Yogurt and blueberries stopped eating because can't find sweet large blueberries. Tried grapes but didn't like. Foods he is eating, chicken tenders, vegetable pasta. When invited out, restaurants are tricky, can't control himself and will choose higher fat and carb options. 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 Has food at internship and feels tempted. Barriers/concerns preventing patient from achieving goal(s) since last visit: eating out with family, internship provides foods. Notes he \"can't control\" it if the food is in front of him. He does better on his own. NUTRITION INTERVENTION:  Pt educated on nutrition recommendations for the New Direction Low Calorie Plan (LCD), with the specific meal pattern of 2 meal replacements every day plus a meal and a snack. Grocery meal:  Use the balanced plate method to plan meals, include 3-6 oz of lean source of protein, unlimited non-starchy vegetables, 1/2 cup whole grains/beans OR 1/2 cup fruit OR 1 serving of low fat dairy. Utilize handouts listing healthy snack and meal ideas. Read all nutrition labels. Demonstrated and emphasized identifying serving size, total fat, sugar and protein content. Defined low fat as </= 3 g per serving. Discussed lean and extra lean sources of protein. Avoid foods with sugar listed in the first 3 ingredients and >/10 g sugar per serving. Consume meal replacements every three to four hours or pattern as discussed with provider. Mix with water. May add herbs/spices for taste. Practice mindful eating habits; take small bites, chew thoroughly, avoid distractions, utilize hunger/fullness scale. Reviewed attendance policy of attending weekly nutrition classes. Metabolic support group recommended, and increase physical activity (approved per MD) for long term weight maintenance.       NUTRITION MONITORING AND EVALUATION: pt remains limited in palate but willing to try new foods listed below. Will continue to encourage. The following goals were established with patient;  1) try cod/flounder/tiplaipa or other flaky fish for protein  2) asparagus   3) dannon light and fit  4) oikos   5) siggis  6) 10 minutes of boxing, 20 minutes of less intesve video game exercise. Specific tips and techniques to facilitate compliance with above recommendations were provided and discussed. If further details are desired please contact me at [phone]. This phone number was also provided to the patient for any further questions or concerns.           Parveen Kitchen, MS, RD, LDN

## 2021-08-09 ENCOUNTER — CLINICAL SUPPORT (OUTPATIENT)
Dept: SURGERY | Age: 26
End: 2021-08-09

## 2021-08-09 VITALS
OXYGEN SATURATION: 99 % | BODY MASS INDEX: 34.7 KG/M2 | DIASTOLIC BLOOD PRESSURE: 80 MMHG | SYSTOLIC BLOOD PRESSURE: 112 MMHG | HEART RATE: 70 BPM | WEIGHT: 235 LBS

## 2021-08-09 DIAGNOSIS — E66.09 CLASS 1 OBESITY DUE TO EXCESS CALORIES WITH SERIOUS COMORBIDITY AND BODY MASS INDEX (BMI) OF 34.0 TO 34.9 IN ADULT: Primary | ICD-10-CM

## 2021-08-11 ENCOUNTER — CLINICAL SUPPORT (OUTPATIENT)
Dept: SURGERY | Age: 26
End: 2021-08-11

## 2021-08-11 DIAGNOSIS — E66.9 OBESITY (BMI 30-39.9): Primary | ICD-10-CM

## 2021-08-16 NOTE — PROGRESS NOTES
[facility]  Metabolic Weight Loss Program        Patient's Name: Homer Ellis  : 1995    This patient is enrolled in [address] [place] [address] Weight Loss Program and attended the required weekly virtual nutrition class hosted via [employer].       Taylor Kendrick RD

## 2021-08-18 ENCOUNTER — VIRTUAL VISIT (OUTPATIENT)
Dept: SURGERY | Age: 26
End: 2021-08-18
Payer: MEDICAID

## 2021-08-18 DIAGNOSIS — F84.5 ASPERGER'S SYNDROME: ICD-10-CM

## 2021-08-18 DIAGNOSIS — F88 SID (SENSORY INTEGRATION DISORDER): ICD-10-CM

## 2021-08-18 DIAGNOSIS — E78.5 DYSLIPIDEMIA: ICD-10-CM

## 2021-08-18 DIAGNOSIS — R63.4 WEIGHT LOSS: ICD-10-CM

## 2021-08-18 DIAGNOSIS — F41.1 GENERALIZED ANXIETY DISORDER: ICD-10-CM

## 2021-08-18 DIAGNOSIS — E66.09 CLASS 1 OBESITY DUE TO EXCESS CALORIES WITH SERIOUS COMORBIDITY AND BODY MASS INDEX (BMI) OF 34.0 TO 34.9 IN ADULT: Primary | ICD-10-CM

## 2021-08-18 DIAGNOSIS — F51.04 CHRONIC INSOMNIA: ICD-10-CM

## 2021-08-18 DIAGNOSIS — E55.9 VITAMIN D DEFICIENCY: ICD-10-CM

## 2021-08-18 DIAGNOSIS — F98.8 ATTENTION DEFICIT DISORDER (ADD) WITHOUT HYPERACTIVITY: ICD-10-CM

## 2021-08-18 PROCEDURE — G8427 DOCREV CUR MEDS BY ELIG CLIN: HCPCS | Performed by: FAMILY MEDICINE

## 2021-08-18 PROCEDURE — 99214 OFFICE O/P EST MOD 30 MIN: CPT | Performed by: FAMILY MEDICINE

## 2021-08-18 PROCEDURE — G9717 DOC PT DX DEP/BP F/U NT REQ: HCPCS | Performed by: FAMILY MEDICINE

## 2021-08-18 NOTE — PROGRESS NOTES
[address] [address], 200 [facility] 22.  [phone] o  350 [facility] VISIT    Patient Name:  Clarissa Celis, 1995    PCP:  Maude Atkinson MD    Clarissa Celis was evaluated through a synchronous (real-time) audio-video encounter. The patient (or guardian if applicable) is aware that this is a billable service. Verbal consent to proceed has been obtained within the past 12 months. The visit was conducted pursuant to the emergency declaration under the [facility], [address] [place] [address] authority and the [employer] General Act. Patient identification was verified. The patient was located in a state where the provider was credentialed to provide care. Method of Delivery:  Video  Platform:  Doxy. me  My location:  Office                [place]  [place]    Clarissa Celis is a 27 y.o. male with Obesity Class 1 There is no height or weight on file to calculate BMI. and associated health concerns was seen by synchronous (real-time) audio-video technology on 8/18/2021 for Weight Management    Patient has been participating in the [employer] Management Program since 06/03/21 using the [employer] New Direction Low Calorie Diet (LCD, 1097-3421 kcal/day.)    What makes it difficult to adhere to this plan of care over the past month:  Difficulty w life issues makes it difficult to stick to this diet.       Patient goals for participation in this weight management program:   228 lbs in 4 months (by 10/2021)    Anthropometric Measures Previously    Start Weight:   258 lbs 0 oz  BMI:   38.10 kg/m2   Neck circumference:  17.7 in   Waist circumference:  50.5 in  Body Fat Percentage:  31.7 %     Weight at time of last appointment on 07/12/21:   239 lbs 0 oz        Anthropometric Measures Self-Reported Today  Today's Weight: 234 lbs 0 oz, 5 lbs down since last appointment in our Center. Weight Metrics 8/9/2021 7/26/2021 7/12/2021 7/12/2021 6/22/2021 6/22/2021 6/10/2021   Weight 235 lb 238 lb - 239 lb - 247 lb 12.8 oz -   Neck Circ (inches) - - - - 17 - 17.5   Waist Measure Inches - - - - 49.5 - 49.5   Body Fat % - - 29.5 - 31 - 31.7   BMI 34.7 kg/m2 35.15 kg/m2 - 35.29 kg/m2 - 36.59 kg/m2 -       Total pounds loss since starting this therapeutic approach: 24 lbs   Contributing factors to weight loss:  commitment    Is patient satisfied with his/her progress since the last appointment:  Not as much as I like. SURGICAL HISTORY  Previous Weight Loss Surgery:  0 Bariatric Surgeon:  0   Date of Surgery:  0     History reviewed. No pertinent surgical history. EATING HABITS  Total calories consumed per day:  Up to 2000 kcal because of more social events      Number of Meal replacements consumed per day:  2-3 [employer] New Direction food products  Negative Side Effects:  0     Typical Meals: \"I don't remember bc I don't think about the timing of meals when I am eating them. \" - chicken tenders, flavored yogurt, tacos randomly throughout the day       Breakfast: no longer yogurt w blueberries       Lunch:  na       Dinner: na      Snacks: na    Barriers to eating meals:  Visiting family and friends who want to feed me to be hospitable.     Does patient want to continue use of New Direction meal replacements:  Yes, sure    DRINKING HABITS  Average amount of water consumed daily: 3- ?32 oz/day  (Goal 2 L or 67 oz daily, minimally)  Amount of caffeine consumed daily: occ Red Bull at work 16 oz on Thursday  Sugar-sweetened beverages consumed daily (including alcohol, sodas, teas, juices, etc.): use Skittles water flavoring     Social History     Tobacco Use    Smoking status: Never Smoker    Smokeless tobacco: Never Used   Vaping Use    Vaping Use: Never used   Substance Use Topics    Alcohol use: Yes     Comment: weekends, socially    Drug use: Not Currently         SLEEP HABITS  Total hours of sleep nightly: 8-10 hours (Goal 7-9 hours/nightly)  Rx or OTC Sleep Aids:  Rx Ambien and Melatonin  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 [employer]   Work hours: 10a-3p  Personal or family history of Sleep Apnea:  no CPAP use:  no  Sleep Specialist:  na  Barriers to getting proper rest:  0     PHYSICAL ACTIVITY HISTORY  Type of physical activity:  Virtual boxing w VR headset  Physical activity is performed 2-3 times a week for 20-40 minutes 1-2 times in the day  Enjoyment with physical activity:  Yes, its fun  Pedometer or fitness tracker/Smartwatch/ Iphone use: no     Average number steps per day:  0,000 steps/day    Barriers limiting physical activity:  0    Mobile Apps used for meal planning, calorie counting, water consumption, sleep, or physical activity:  0     WEIGHT LOSS MEDICATION HISTORY  Current weight loss medication:  0   Any weight loss medication prescribed by our office:  0    Outpatient Medications Marked as Taking for the 8/18/21 encounter (Virtual Visit) with Magaly Gentile,    Medication Sig Dispense Refill    B.infantis-B.ani-B.long-B.bifi (Probiotic 4X) 10-15 mg TbEC Take  by mouth.  pantoprazole (PROTONIX) 40 mg tablet Take 40 mg by mouth daily.  escitalopram oxalate (LEXAPRO) 10 mg tablet Take 10 mg by mouth daily.  zolpidem CR (AMBIEN CR) 12.5 mg tablet Take 12.5 mg by mouth nightly.  MEN'S MULTI-VITAMIN PO Take 1 Tablet by mouth daily.  omega 3-dha-epa-fish oil (Fish Oil) 100-160-1,000 mg cap Take 2,000 mg by mouth daily.  cholecalciferol (Vitamin D3) 25 mcg (1,000 unit) cap Take 1,000 Units by mouth daily.  magnesium 250 mg tab Take 1 Tablet by mouth daily.  melatonin 5 mg cap capsule Take 5 mg by mouth nightly.        Medications that cause weight gain:  Lexapro, Protonix  Medications that cause weight loss:  0  Changes to medications since last office visit with me: Will be starting a new Rx for Propecia to help hair thinning, started prior to starting LCD. Dad lost his hair in his 35s. 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 Dre Benjamin, psychiatrist previously now sees pcp for Rx since stable - Lexapro 10 mg daily  Is the mental health condition controlled: \"It's fine. \"    Any current major lifestyle changes or stressors:   My chronic disorder has affected me for the last decade. Doing better completing [facility] [place] [place] homework sheets. OTHER MEDICAL CARE SINCE LAST OFFICE VISIT  Saw dermatologist, Dr. Jigna Choudhary today who rxd the Propecia for hairloss.        ASSOCIATED MEDICAL CONDITIONS  Past Medical History:   Diagnosis Date    ADD (attention deficit disorder) 05/21/2015    Anxiety 05/21/2015    Dr. Carley Rosas Asperger's syndrome 05/21/2015    Chronic insomnia     Patellar dislocation     Left    LIDIA (sensory integration disorder) 05/21/2015    Dr. Emerita Sampson at [facility], peds genetics       Family History   Problem Relation Age of Onset    Cancer Father         non-Hodgkin's Lymphoma    Dementia Father     Stroke Maternal Grandmother 80    Osteoporosis Mother     Thyroid Disease Mother     No Known Problems Maternal Grandfather     No Known Problems Paternal Grandmother     No Known Problems Paternal Grandfather         gun shot wound  Obesity Maternal Aunt     Other Maternal Aunt         mood swings, sensory overload    Obesity Half-sibling         ALL ARE OBESE     Social History     Substance and Sexual Activity   Sexual Activity Not Currently    Partners: Female    Birth control/protection: Abstinence     Do you have upcoming travel in the next 6 weeks:  no.   Patient will also notify the dietician for recommendations during travel. Immunization History   Administered Date(s) Administered    COVID-19, PFIZER, MRNA, LNP-S, PF, 30MCG/0.3ML DOSE 03/12/2021, 04/09/2021       Health Maintenance   A1c:  SEE RESULTS BELOW     Lipids:  SEE RESULTS BELOW  Depression Screening:  Performed during [facility] to [facility]  Colonoscopy:  na, for patients over 47 yo  Mammogram:  na, for female patients over 35 yo  Annual Wellness Exam:  To be performed by PCP, when appropriate. ROS    Pt denies hunger, cravings, lack of focus, fatigue, feeling weak, headaches, dizziness, light headedness, nausea, vomiting, diarrhea, constipation, indigestion, rapid heart rate, SOB, low blood sugar, feeling cold, hair loss, rash, fluid retention, leg aches, difficulty sleeping, irritability, mood swings, or other associated symptoms. Review of Systems negative except as noted above in HPI.     PHYSICAL EXAMINATION  (Limited due to being a telehealth encounter)  Self-reported Vital Signs:  234 lbs today    Patient-Reported Vitals 8/18/2021   Patient-Reported Weight 234 lbs          Weight Metrics 8/9/2021 7/26/2021 7/12/2021 7/12/2021 6/22/2021 6/22/2021 6/10/2021   Weight 235 lb 238 lb - 239 lb - 247 lb 12.8 oz -   Neck Circ (inches) - - - - 17 - 17.5   Waist Measure Inches - - - - 49.5 - 49.5   Body Fat % - - 29.5 - 31 - 31.7   BMI 34.7 kg/m2 35.15 kg/m2 - 35.29 kg/m2 - 36.59 kg/m2 -          Neck Circumference:  Acceptable range for M >16 inches, F>15 inches  Waist Circumference:  Acceptable range for M> 40 inches/102 cm, F > 35 inches, 88 cm  Body Fat: Acceptable range for M 18-24%, F 25-31%    General appearance - Well nourished. Well appearing. Well developed. No acute distress. Obese. Head - Normocephalic. Atraumatic. Eyes - Extraocular eye movements intact. Sclera anicteric. Ears - Hearing is grossly normal bilaterally. Nose - normal and patent. No discharge. No nasal flaring noted. Mouth - oral mucous membranes with adequate moisture. Neck -   Midline trachea. No neck masses noted. No neck retractions noted. Chest - Unlabored respirations. Symmetrical chest.  No conversational dyspnea noted. Heart - normal rate. Neurological - awake, alert and oriented to person, place, and time and event. Cranial nerves II through XII intact. Clear speech. Muscle strength is +5/5 x 4 extremities. Steady gait. Musculoskeletal - Intact x 4 extremities. No pain with movement. Psychological -   normal behavior, dress and thought processes. Good insight. Good eye contact. Cautious affect. Appropriate mood. Normal speech. DATA REVIEWED    No results found for: WBC, WBCLT, HGBPOC, HGB, HGBP, HCTPOC, HCT, PHCT, RBCH, PLT, MCV, HGBEXT, HCTEXT, PLTEXT  Lab Results   Component Value Date/Time    Sodium 140 07/03/2021 08:13 AM    Potassium 5.0 07/03/2021 08:13 AM    Chloride 102 07/03/2021 08:13 AM    CO2 26 07/03/2021 08:13 AM    Glucose 90 07/03/2021 08:13 AM    BUN 19 07/03/2021 08:13 AM    Creatinine 0.98 07/03/2021 08:13 AM    BUN/Creatinine ratio 19 07/03/2021 08:13 AM    GFR est  07/03/2021 08:13 AM    GFR est non- 07/03/2021 08:13 AM    Calcium 10.0 07/03/2021 08:13 AM    Bilirubin, total 0.5 07/03/2021 08:13 AM    Alk.  phosphatase 74 07/03/2021 08:13 AM    Protein, total 6.7 07/03/2021 08:13 AM    Albumin 4.6 07/03/2021 08:13 AM    A-G Ratio 2.2 07/03/2021 08:13 AM    ALT (SGPT) 35 07/03/2021 08:13 AM    AST (SGOT) 24 07/03/2021 08:13 AM     Lab Results   Component Value Date/Time    Hemoglobin A1c 4.9 07/03/2021 08:13 AM Lab Results   Component Value Date/Time    TSH 1.000 07/03/2021 08:13 AM     No results found for: URICA, UAU1  No results found for: MG  No results found for: B12LT, FOL, RBCF  No results found for: VITD3, XQVID2, XQVID3, XQVID, VD3RIA, UZSX31TYYSZ  No results found for: IRON, FE, TIBC, IBCT, PSAT, FERR  Lab Results   Component Value Date/Time    Cholesterol, total 197 07/03/2021 08:13 AM    HDL Cholesterol 36 (L) 07/03/2021 08:13 AM    LDL, calculated 131 (H) 07/03/2021 08:13 AM    VLDL, calculated 30 07/03/2021 08:13 AM    Triglyceride 169 (H) 07/03/2021 08:13 AM     EKG:  Not available    ASSESSMENT     ICD-10-CM ICD-9-CM    1. Class 1 obesity due to excess calories with serious comorbidity and body mass index (BMI) of 34.0 to 34.9 in adult  E66.09 278.00     Z68.34 V85.34    2. Weight loss  R63.4 783.21     24# since starting LCD 06/2021 due to effort   3. Chronic insomnia  F51.04 780.52     stable on Ambien and Melatonin   4. Generalized anxiety disorder  F41.1 300.02     stable on Lexapro 10 mg   5. High cholesterol  E78.00 272.0    6. Vitamin D deficiency  E55.9 268.9    7. Asperger's syndrome  F84.5 299.80    8. Attention deficit disorder (ADD) without hyperactivity  F98.8 314.00    9. LIDIA (sensory integration disorder)  F88 315.8    10. BMI 34.0-34.9,adult  Z68.34 V85.34        We discussed the expected course, resolution and complications of the diagnosis(es) in detail. WEIGHT MANAGEMENT PLAN  Agree with nurse documentation. Chart was reviewed and updated during the office visit today. Darin Ch has fulfilled [facility] program requirements this month by completing homework forms, attending educational classes, nurse triage visits and monthly provider appointments as agreed and remains in good standing. Reviewed and appreciate registered dietician note for nutritional counseling. Patient has attended all requirements of the program over the past month and is in good standing. Reviewed and appreciate biweekly nurse visits and agree with documentation. Followed up on any patient concerns today. Emphasized the importance of the patient continuing care from current primary care provider and other specialists while participating in this weight management program.  Patient has full access to all our office notes, orders, lab results on [employer] and can provide them copies, if desired. Advised patient to follow up with pcp for any acute symptoms and Health Maintenance. Continue current medications as directed by prescribers. Identified and discussed medications patient is taking that can cause weight gain or weight loss as recorded on patient's medication list.  Advised patient not to stop any use without discussing with the prescribing provider. Ask prescribing providers to consider more weight neutral or weight negative options. Will monitor patient's weight and health with continued use. Supplement recommendations: Take OTC Magnesium 400 mg po at night prn sleep, muscle cramping, constipation. Take OTC vit B12 1000 mcg daily orally if taking Metformin or experiencing numbness and tingling. Take OTC Fish Oil 1000 mg with EPA and -1,000 mg daily if experiencing dry skin, low HDL. Use with caution if history of heartburn exists. Increase fiber products (I.e. fiber tea, fiber shakes) or try OTC Fiber products (I.e. Benefiber, Metamucil, psyllium, etc) if experiencing constipation or hunger. Referred patient to New Direction Patient Manual for recommended antidotes, if any new symptoms or side effects have been experienced once dietary approach is initiated. Advised patient to notify our office if this occurs. If patient has had bariatric surgery, Nimisha Hopsonrio was advised to take the vitamin regimen and follow dietary recommendations as directed by patient's bariatric surgical team.  Get annual labs to re-evaluate vitamin levels. Avoid NSAIDS.   Avoid concentrated sweets and carbonated beverages. Reviewed and discussed most recent lab results and EKG results. Unable to locate EKG today, will perform it at face to face visit next month. Will repeat EKG for ever 50 lbs of weight loss, per New Direction guidelines. I  Advised patient to sign up for MyChart and view lab results directly. Notify me by [facility] if any questions or concerns arise. Discussed the patient's BMI, anthropometric measures and goals with patient. The weight management follow up plan is as follows:     Dietary Prescription:    Recommended meal plan:  New Direction Low Calorie Dietary approach, 6566-9205 kcal/day, 2-3 meal replacements daily. Make sure proper daily calories are consumed with each meal.  Encouraged patient to stay within the recommended amount of calories per day. Do not skip meals. Meals must be eaten within a 3-4 hour time period in order to prevent potential side effects, including low blood sugar. Strongly emphasized that patient drink a minimum of 2 L (67 oz) of water daily up to half your body weight in ounces of water while utilizing this dietary approach to prevent dehydration and potential side effects. Drink the majority of water prior to supper to prevent nocturnal urination. Avoid sugar-sweetened beverages, including sodas, alcohol, juice. Limit caffeine intake to prevent sleep interference, heart palpitations and dehydration from increased urination. Will allow 1 8 oz cup of black coffee or green tea (to stimulate release of Adiponectin and promote fat burning. ). Consume caffeine 6-12 hours before bedtime to prevent sleep disturbances. Try to drink one bottle of water without Skittles flavor packet/day. Referred patient to New Direction Patient Manual for recommended antidotes, if any new symptoms or side effects have been experienced once dietary approach is initiated. Advised patient to notify our office if this occurs. Exercise Prescription:   Emphasized importance of mild physical activity and reducing sedentary time. Advised patient to work with RD to ensure you have proper calories for your level of activity. A goal of 30 minutes physical activity daily is recommended for health benefit and at least 60 minutes daily to prevent weight regain. During weight loss phase, no less than 75% of this time needs to be performing aerobic (i.e. Walking) activity with no more than 25% of the time performing resistance exercises (i.e. Weight lifting, strengthening, toning). During weight maintenance phase, 50% of the physical activity time needs to be spent performing aerobic activity with 50% of the total time performing resistance exercises. Sleep Prescription: A goal of 7-9 hours nightly of uninterrupted sleep is recommended to turn off the Grehlin hormone to be released from the stomach and triggers appetite while promoting weight gain. Proper rest turns on Leptin hormone to be released from white adipose tissue and promotes weight loss. Discussed snoring, symptoms of Sleep Apnea and improvements with weight loss. Strongly encouraged compliance with CPAP, if Sleep Apnea has been diagnosed. Advised patient to follow up with sleep specialist for every 25 pounds lost to see if CPAP settings need to be adjusted. Counseled patient on health topics:  Obesity, Insulin Resistance, LCD dietary approaches, benefits, contraindications, possible side effects to these diets and how to prevent poor outcomes (including staying hydrated, limit physical exercise while transitioning into this program, avoid skipping meals, etc), weight loss goals, sleep hygiene, barriers to losing weight and keeping weight off, strategies to overcome habits or challenges to approve or continue adherence and stressors.     Informed patient that weight loss goal of 5-10% in 6-12 months has shown significant improvement in obesity and its related health conditions including DM, heart disease, asthma. A key study published in Arthritis & Rheumatism of Overweight and Obese Adults with OA found that losing 1 pound of weight resulted in 4 pounds of pressure being removed from the knees. Immunizations noted. Covid vaccines recommended, if patient has not had it. Obesity may increase risk for severe illness and death from Covid-19 disease. Offered empathy, encouragement, legitimation, prayers, partnership to patient. Praised patient for successes. Patient was offered a choice/choices in the treatment plan today. Patient expressed understanding with the diagnoses and the plan and agrees with recommendations. Return in about 1 month (around 9/18/2021) for ND LCD f2f. Total time:  35 mins spent in counseling, coordinating care, reviewing and discussing results, reviewing and discussing relevant provider communication, completing relevant documentation, and discussing treatment plans in reference to There were no encounter diagnoses. [address] [facility] [address], MD FOR ALLOWING ME THE PRIVILEGE TO PARTICIPATE IN THE CARE OF OUR MUTUAL PATIENT, Mr. Lou Perez. Wil Medina DO, Diplomate [employer]    There are no Patient Instructions on file for this visit.

## 2021-08-23 ENCOUNTER — CLINICAL SUPPORT (OUTPATIENT)
Dept: SURGERY | Age: 26
End: 2021-08-23

## 2021-08-23 VITALS
OXYGEN SATURATION: 99 % | HEIGHT: 69 IN | DIASTOLIC BLOOD PRESSURE: 82 MMHG | RESPIRATION RATE: 18 BRPM | WEIGHT: 233 LBS | BODY MASS INDEX: 34.51 KG/M2 | SYSTOLIC BLOOD PRESSURE: 124 MMHG | HEART RATE: 80 BPM

## 2021-08-23 DIAGNOSIS — E66.09 CLASS 1 OBESITY DUE TO EXCESS CALORIES WITH SERIOUS COMORBIDITY AND BODY MASS INDEX (BMI) OF 34.0 TO 34.9 IN ADULT: Primary | ICD-10-CM

## 2021-08-23 NOTE — PROGRESS NOTES
Progress Note: Weekly Education Class in the [facility] Weight Loss Program     1) Patient is on Very Low Calorie Diet [] (4 meal replacements per day, 800 kcal/day)      Low Calorie Diet [x] (2-3 meal replacements per day, 4726-2730 kcal/day)    Did patient have any new symptoms or physical problems? Yes []   or  No [x]    If yes, check & comment: weakness [], fatigue [], lightheadedness [], headache [], cramps [], cold intolerance [], hair loss [], diarrhea [], constipation [],  NA [] other:                                 Has patient had any medical attention from other providers, urgent care or the emergency room this week? Yes []  or No [x]       NA [], If yes, why:                                         2) Number of meal replacements consumed daily? 2-3 (range)  NA []    Did you eat any food outside of the program? Yes [] No [x]    3) Average ounces of water patient consumed daily this week (not including shakes)? n/a     (divide the weekly total by 7)    Any other sugar sweetened beverages consumed this week? Yes []  No [x]    Did patient have any problems adhering to the diet? Yes []  No [x] NA []    If yes, Vacation [], Celebrations [], Conferences [], Family Reunions [] other:                                                4) How has patient mood overall been this week? Sad [], Happy [], Stressed [], Tired [], Content [x], NA [], other            5) Physical Activity Over the Past Week:    Cardio exercise: 0 min  Strength exercise: 0 workouts / week  Number of steps walked per day: n/a      Medications reconciled by nurse Yes [x]  No[]    Patient was given therapeutic recommendations for any noted side effects of their dietary approach based upon [facility] patient manual per providers recommendation.

## 2021-08-23 NOTE — PROGRESS NOTES
Progress Note: Weekly Education Class in the [facility] Weight Loss Program         Patient is on Very Low Calorie Diet [] (4 meal replacements per day, 800 kcal/day)      Low Calorie Diet [x] (2-3 meal replacements per day, 4612-1777 kcal/day)    1) Did patient have any new symptoms or physical problems? Yes []    No [x]    If yes, check & comment: weakness [], fatigue [], lightheadedness [], headache [], cramps [], cold intolerance [], hair loss [], diarrhea [], constipation [],  NA [] other:                                 2) Has patient had any medical attention from other providers, urgent care or the emergency room this week? Yes []  No [x]       NA [], If yes, why:                                      3) Any other sugar sweetened beverages consumed this week? Yes []  No [x]    4) Did patient have any problems adhering to the diet? Yes []  No [x] NA []    If yes, Vacation [], Celebrations [], Conferences [], Family Reunions [] other:                                                5) How many hours of sleep this week?    (range)  NA [x]    Number of meal replacements consumed daily? 2-3 (range)  NA []    Average ounces of water patient consumed daily this week (not including shakes)? n/a     (divide the weekly total by 7)    Did you eat any food outside of the program? Yes [] No [x]    Physical Activity Over the Past Week:    Cardio exercise: 0 min  Strength exercise: 0 workouts / week  Number of steps walked per day: n/a    How has patient mood overall been this week? Sad [], Happy [], Stressed [], Tired [], Content [x], NA [], other            Medications reconciled by nurse Yes [x]  No[]    Patient was given therapeutic recommendations for any noted side effects of their dietary approach based upon [facility] patient manual per providers recommendation.

## 2021-09-01 ENCOUNTER — CLINICAL SUPPORT (OUTPATIENT)
Dept: SURGERY | Age: 26
End: 2021-09-01

## 2021-09-01 DIAGNOSIS — E66.09 CLASS 1 OBESITY DUE TO EXCESS CALORIES WITH SERIOUS COMORBIDITY AND BODY MASS INDEX (BMI) OF 34.0 TO 34.9 IN ADULT: Primary | ICD-10-CM

## 2021-09-01 NOTE — PROGRESS NOTES
Aurelia Schwartz presents for weekly or bi-monthly evaluation of Obesity Body mass index is 34.41 kg/m². Visit Vitals  /82 (BP 1 Location: Left arm, BP Patient Position: Sitting)   Pulse 80   Resp 18   Ht 5' 9\" (1.753 m)   Wt 233 lb (105.7 kg)   SpO2 99%   BMI 34.41 kg/m²       Wt Readings from Last 3 Encounters:   08/23/21 233 lb (105.7 kg)   08/09/21 235 lb (106.6 kg)   07/26/21 238 lb (108 kg)       1. Class 1 obesity due to excess calories with serious comorbidity and body mass index (BMI) of 34.0 to 34.9 in adult         I have reviewed and agree with nurse documentation of Weekly Education Class nurse progress note for [employer] [facility] [address] [place] [address] [place]). Aurelia Schwartz is compliant with program requirements and may continue participation utilizing the [employer] Direction meal replacements, as discussed. Patient has been advised to refer to the [facility] Patient Manual and notify us if any side effects to this meal plan are present and/or persist.  Aurelia Schwartz may continue the current dietary approach, care and follow up at the monthly office visit with the provider, as scheduled. Follow-up and Dispositions    · Return in about 2 weeks (around 9/6/2021) for weight, ND LCD. Documentation true and accepted by Laith Bird.  Stephania Nails., AOBFP, Diplomate [employer]

## 2021-09-01 NOTE — PROGRESS NOTES
[facility]  Metabolic Program Follow-up Nutrition Consult    Date: 2021   Physician: Irma Weathers DO  Name: Casper Daniels  :  1995    Type of Plan: LCD  Weeks on Plan: not quite 3 months  Virtual visit was completed through [employer]. ASSESSMENT:    Medications/Supplements:   Prior to Admission medications    Medication Sig Start Date End Date Taking? Authorizing Provider   B.infantis-B.ani-B.long-B.bifi (Probiotic 4X) 10-15 mg TbEC Take  by mouth. Provider, Historical   pantoprazole (PROTONIX) 40 mg tablet Take 40 mg by mouth daily. 21   Provider, Historical   escitalopram oxalate (LEXAPRO) 10 mg tablet Take 10 mg by mouth daily. 21   Provider, Historical   zolpidem CR (AMBIEN CR) 12.5 mg tablet Take 12.5 mg by mouth nightly. 5/10/21   Provider, Historical   MEN'S MULTI-VITAMIN PO Take 1 Tablet by mouth daily. Provider, Historical   omega 3-dha-epa-fish oil (Fish Oil) 100-160-1,000 mg cap Take 2,000 mg by mouth daily. Provider, Historical   cholecalciferol (Vitamin D3) 25 mcg (1,000 unit) cap Take 1,000 Units by mouth daily. Provider, Historical   magnesium 250 mg tab Take 1 Tablet by mouth daily. Provider, Historical   melatonin 5 mg cap capsule Take 5 mg by mouth nightly. 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 He feels more tempted when there is a menu with his favorite foods - pizza, chicken tenders.   He is still tracking calories and reading labels. Has reduced from 1500 kcals to 1200kcals to encourage weight loss. He rarely still eats vegetables but did buy vegetable pasta at last store visit. 24 Hour Diet Recall  Breakfast  shake   Lunch  shake   Dinner  Chicken tenders only as a meal    Snacks  Yogurt - siggis, chobani. 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 NUTRITION INTERVENTION:  Pt educated on nutrition recommendations for the New Direction Low Calorie Plan (LCD), with the specific meal pattern of 2 meal replacements every day plus a meal and snack. Grocery meal:  Use the balanced plate method to plan meals, include 3-6 oz of lean source of protein, unlimited non-starchy vegetables, 1/2 cup whole grains/beans OR 1/2 cup fruit OR 1 serving of low fat dairy. Utilize handouts listing healthy snack and meal ideas. Read all nutrition labels. Demonstrated and emphasized identifying serving size, total fat, sugar and protein content. Defined low fat as </= 3 g per serving. Discussed lean and extra lean sources of protein. Avoid foods with sugar listed in the first 3 ingredients and >/10 g sugar per serving. Consume meal replacements every three to four hours or pattern as discussed with provider. Mix with water. May add herbs/spices for taste. Practice mindful eating habits; take small bites, chew thoroughly, avoid distractions, utilize hunger/fullness scale. Reviewed attendance policy of attending weekly nutrition classes. Metabolic support group recommended, and increase physical activity (approved per MD) for long term weight maintenance. NUTRITION MONITORING AND EVALUATION: pt still very limited in types of foods, even after encouragement, agreeing on a new vegetable, and discussing how best to prepare it and what to eat it with, pt still not increasing or trying new foods.   He reports having difficulty with this, and that he only has a few foods he likes. Discussed getting foods that incorporate vegetables already in them such as vegetable pasta. He is agreeable. He is losing weight because he is limiting calories. Encouraged MVI every day to fill void of what he is not getting in food. We agreed moving to nutrition visits PRN is best at this time. The following goals were established with patient;  1) try the vegetable parents make at meal time so he doesn't have to prepare it  2) increase movement as tolerated working up to 150 minutes minimum per week  3) great job on calorie counting and reading labels, keep doing this! 4)followup PRN    Specific tips and techniques to facilitate compliance with above recommendations were provided and discussed. If further details are desired please contact me at [phone]. This phone number was also provided to the patient for any further questions or concerns.           Flako Lopez MS, RD, LDN

## 2021-09-01 NOTE — PROGRESS NOTES
Progress Note: Weekly Education Class in the [facility] Program         Patient is on Very Low Calorie Diet [x] (4 meal replacements per day, 800 kcal/day)      Low Calorie Diet [] (2-3 meal replacements per day, 4478-4112 kcal/day)    1) Did patient have any new symptoms or physical problems? Yes []    No [x]    If yes, check & comment: weakness [], fatigue [], lightheadedness [], headache [], cramps [], cold intolerance [], hair loss [], diarrhea [], constipation [],  NA [] other:                                 2) Has patient had any medical attention from other providers, urgent care or the emergency room this week? Yes []  No [x]       NA [], If yes, why:                                      3) Any other sugar sweetened beverages consumed this week? Yes []  No [x]    4) Did patient have any problems adhering to the diet? Yes []  No [x] NA []    If yes, Vacation [], Celebrations [], Conferences [], Family Reunions [] other:                                                5) How many hours of sleep this week?     (range)  NA [x]    Number of meal replacements consumed daily? 2  (range)  NA []    Average ounces of water patient consumed daily this week (not including shakes)? 64   (divide the weekly total by 7)    Did you eat any food outside of the program? Yes [x] No []    Physical Activity Over the Past Week:    Cardio exercise: 45 min  Strength exercise: 0 workouts / week  Number of steps walked per day: N/A    How has patient mood overall been this week? Sad [], Happy [x], Stressed [], Tired [], Content [x], NA [], other            Medications reconciled by nurse Yes [x]  No[]    Patient was given therapeutic recommendations for any noted side effects of their dietary approach based upon [facility] patient manual per providers recommendation.

## 2021-09-01 NOTE — PROGRESS NOTES
Jorge Dixon presents for weekly or bi-monthly evaluation of Obesity Body mass index is 34.7 kg/m². Visit Vitals  /80 (BP 1 Location: Left arm, BP Patient Position: Sitting)   Pulse 70   Wt 235 lb (106.6 kg)   SpO2 99%   BMI 34.70 kg/m²       Wt Readings from Last 3 Encounters:   08/23/21 233 lb (105.7 kg)   08/09/21 235 lb (106.6 kg)   07/26/21 238 lb (108 kg)       1. Class 1 obesity due to excess calories with serious comorbidity and body mass index (BMI) of 34.0 to 34.9 in adult         I have reviewed and agree with nurse documentation of Weekly Education Class nurse progress note for [facility] Weight [address] [facility] [place]). Jorge Dixon is compliant with program requirements and may continue participation utilizing the New Direction meal replacements, as discussed. Patient has been advised to refer to the [facility] Patient Manual and notify us if any side effects to this meal plan are present and/or persist.  Jorge Dixon may continue the current dietary approach, care and follow up at the monthly office visit with the provider, as scheduled. Follow-up and Dispositions    · Return in about 2 weeks (around 8/23/2021) for weight. Routing History            Documentation true and accepted by Olga Banuelos.  Autumn Huff., AOBFP, Diplomate [employer]

## 2021-09-02 ENCOUNTER — OFFICE VISIT (OUTPATIENT)
Dept: SURGERY | Age: 26
End: 2021-09-02

## 2021-09-02 DIAGNOSIS — E66.09 CLASS 1 OBESITY DUE TO EXCESS CALORIES WITH SERIOUS COMORBIDITY AND BODY MASS INDEX (BMI) OF 34.0 TO 34.9 IN ADULT: Primary | ICD-10-CM

## 2021-09-03 ENCOUNTER — CLINICAL SUPPORT (OUTPATIENT)
Dept: SURGERY | Age: 26
End: 2021-09-03

## 2021-09-03 VITALS
OXYGEN SATURATION: 98 % | DIASTOLIC BLOOD PRESSURE: 74 MMHG | HEIGHT: 69 IN | HEART RATE: 72 BPM | WEIGHT: 230 LBS | SYSTOLIC BLOOD PRESSURE: 118 MMHG | RESPIRATION RATE: 18 BRPM | BODY MASS INDEX: 34.07 KG/M2

## 2021-09-03 DIAGNOSIS — E66.01 CLASS 3 SEVERE OBESITY DUE TO EXCESS CALORIES WITHOUT SERIOUS COMORBIDITY IN ADULT, UNSPECIFIED BMI (HCC): Primary | ICD-10-CM

## 2021-09-03 NOTE — PROGRESS NOTES
Progress Note: Weekly Education Class in the [facility]         Patient is on Very Low Calorie Diet [] (4 meal replacements per day, 800 kcal/day)      Low Calorie Diet [x] (2-3 meal replacements per day, 5013-7909 kcal/day)    1) Did patient have any new symptoms or physical problems? Yes []    No [x]    If yes, check & comment: weakness [], fatigue [], lightheadedness [], headache [], cramps [], cold intolerance [], hair loss [], diarrhea [], constipation [],  NA [] other:                                 2) Has patient had any medical attention from other providers, urgent care or the emergency room this week? Yes []  No [x]       NA [], If yes, why:                                      3) Any other sugar sweetened beverages consumed this week? Yes []  No [x]    4) Did patient have any problems adhering to the diet? Yes []  No [x] NA []    If yes, Vacation [], Celebrations [], Conferences [], Family Reunions [] other:                                                5) How many hours of sleep this week?     (range)  NA [x]    Number of meal replacements consumed daily? 4-5 (range)  NA []    Average ounces of water patient consumed daily this week (not including shakes)? N/A     (divide the weekly total by 7)    Did you eat any food outside of the program? Yes [] No [x]    Physical Activity Over the Past Week:    Cardio exercise:0 min  Strength exercise: 0 workouts / week  Number of steps walked per day: n/a    How has patient mood overall been this week? Sad [], Happy [], Stressed [], Tired [], Content [], NA [x], other            Medications reconciled by nurse Yes [x]  No[]    Patient was given therapeutic recommendations for any noted side effects of their dietary approach based upon [facility] patient manual per providers recommendation.

## 2021-09-09 ENCOUNTER — OFFICE VISIT (OUTPATIENT)
Dept: SURGERY | Age: 26
End: 2021-09-09

## 2021-09-09 DIAGNOSIS — E66.09 CLASS 1 OBESITY DUE TO EXCESS CALORIES WITH SERIOUS COMORBIDITY AND BODY MASS INDEX (BMI) OF 34.0 TO 34.9 IN ADULT: Primary | ICD-10-CM

## 2021-09-16 NOTE — PROGRESS NOTES
[facility]  Metabolic Weight Loss Program        Patient's Name: Robert Pool  : 1995    This patient is enrolled in [address] [place] [address] Weight Loss Program and attended the required weekly virtual nutrition class hosted via [employer] today.       Tiffany King, MS, RD, LDN

## 2021-09-20 ENCOUNTER — OFFICE VISIT (OUTPATIENT)
Dept: SURGERY | Age: 26
End: 2021-09-20
Payer: MEDICAID

## 2021-09-20 VITALS
OXYGEN SATURATION: 97 % | WEIGHT: 225 LBS | RESPIRATION RATE: 18 BRPM | TEMPERATURE: 98 F | SYSTOLIC BLOOD PRESSURE: 128 MMHG | BODY MASS INDEX: 33.33 KG/M2 | HEART RATE: 81 BPM | DIASTOLIC BLOOD PRESSURE: 86 MMHG | HEIGHT: 69 IN

## 2021-09-20 DIAGNOSIS — E55.9 VITAMIN D DEFICIENCY: ICD-10-CM

## 2021-09-20 DIAGNOSIS — F84.5 ASPERGER'S SYNDROME: ICD-10-CM

## 2021-09-20 DIAGNOSIS — Z82.62 FAMILY HISTORY OF DISUSE OSTEOPOROSIS: ICD-10-CM

## 2021-09-20 DIAGNOSIS — F98.8 ATTENTION DEFICIT DISORDER (ADD) WITHOUT HYPERACTIVITY: ICD-10-CM

## 2021-09-20 DIAGNOSIS — Z83.49 FAMILY HISTORY OF HYPERTHYROIDISM: ICD-10-CM

## 2021-09-20 DIAGNOSIS — F88 SID (SENSORY INTEGRATION DISORDER): ICD-10-CM

## 2021-09-20 DIAGNOSIS — E78.00 HIGH CHOLESTEROL: ICD-10-CM

## 2021-09-20 DIAGNOSIS — F51.04 CHRONIC INSOMNIA: ICD-10-CM

## 2021-09-20 DIAGNOSIS — F32.A ANXIETY AND DEPRESSION: ICD-10-CM

## 2021-09-20 DIAGNOSIS — Z82.49 FAMILY HISTORY OF ABDOMINAL AORTIC ANEURYSM (AAA): ICD-10-CM

## 2021-09-20 DIAGNOSIS — Z82.3 FAMILY HISTORY OF STROKE: ICD-10-CM

## 2021-09-20 DIAGNOSIS — R63.4 WEIGHT LOSS: ICD-10-CM

## 2021-09-20 DIAGNOSIS — F41.9 ANXIETY AND DEPRESSION: ICD-10-CM

## 2021-09-20 DIAGNOSIS — E66.09 CLASS 1 OBESITY DUE TO EXCESS CALORIES WITH SERIOUS COMORBIDITY AND BODY MASS INDEX (BMI) OF 34.0 TO 34.9 IN ADULT: Primary | ICD-10-CM

## 2021-09-20 DIAGNOSIS — Z80.7 FAMILY HISTORY OF NON-HODGKIN'S LYMPHOMA: ICD-10-CM

## 2021-09-20 PROCEDURE — G8417 CALC BMI ABV UP PARAM F/U: HCPCS | Performed by: FAMILY MEDICINE

## 2021-09-20 PROCEDURE — G9717 DOC PT DX DEP/BP F/U NT REQ: HCPCS | Performed by: FAMILY MEDICINE

## 2021-09-20 PROCEDURE — 99214 OFFICE O/P EST MOD 30 MIN: CPT | Performed by: FAMILY MEDICINE

## 2021-09-20 PROCEDURE — G8427 DOCREV CUR MEDS BY ELIG CLIN: HCPCS | Performed by: FAMILY MEDICINE

## 2021-09-20 NOTE — PROGRESS NOTES
Progress Note: Weekly Education Class in the [facility]         Patient is on Very Low Calorie Diet [] (4 meal replacements per day, 800 kcal/day)      Low Calorie Diet [x] (2-3 meal replacements per day, 6412-0488 kcal/day)    1) Did patient have any new symptoms or physical problems? Yes []    No [x]    If yes, check & comment: weakness [], fatigue [], lightheadedness [], headache [], cramps [], cold intolerance [], hair loss [], diarrhea [], constipation [],  NA [] other:                                 2) Has patient had any medical attention from other providers, urgent care or the emergency room this week? Yes []  No [x]       NA [], If yes, why:                                      3) Any other sugar sweetened beverages consumed this week? Yes []  No [x]    4) Did patient have any problems adhering to the diet? Yes []  No [x] NA []    If yes, Vacation [], Celebrations [], Conferences [], Family Reunions [] other:                                                5) How many hours of sleep this week?    (range)  NA [x]    Number of meal replacements consumed daily? 3-5 (range)  NA []    Average ounces of water patient consumed daily this week (not including shakes)? 90   (divide the weekly total by 7)    Did you eat any food outside of the program? Yes [] No [x]    Physical Activity Over the Past Week:    Cardio exercise: 0 min  Strength exercise: 0 workouts / week  Number of steps walked per day: n/a    How has patient mood overall been this week? Sad [], Happy [], Stressed [], Tired [], Content [], NA [x], other           Medications reconciled by nurse Yes [x]  No[]    Patient was given therapeutic recommendations for any noted side effects of their dietary approach based upon [facility] patient manual per providers recommendation.

## 2021-09-20 NOTE — PROGRESS NOTES
[address] [place] [address] 22.  [phone] o  [phone] f    FOLLOW UP MEDICAL EXAMINATION    Method of Delivery:   Face to Face  Patient Name:  Vianney Levine, 1995  PCP:  Pat Yoder MD    Patient's preferred weight management approach:  Leonardo Villasenor is a 27 y.o. male with Obesity Class 1 Body mass index is 33.23 kg/m². and associated health concerns presents for Weight Management    Patient has been participating in the [employer] Management Program since 06/03/21 using the [employer] New Direction Low Calorie Diet (LCD, 5361-6530 kcal/day). Challenges adhering to the plan over the past month:  Adjusting to working on Thursdays    Patient's goals for participating in this weight management program:   228 lbs in 4 months by 10/2021 (Achieved today! !!!)  Now pt wants to continue the same rate of weight loss for the next 6 months (03/2022 11 lbs/month avg)    Anthropometric Measures Previously    Start Weight:     258 lbs 0 oz     BMI:  38.10 kg/m2   Neck circumference:  17.7 in    Waist circumference:  50.5 in   Body fat percentage:  31.7 %       Weight at last appointment on 08/18/21:  234 lbs 0 oz      Anthropometric MeasuresToday  Today's Weight:     225 lbs 0 oz    BMI:  33.23 kg/m2    Neck circumference:  16.25 in   Waist circumference:  42 in  Body fat percentage:  28.5 %    Weight Metrics 9/20/2021 9/20/2021 9/3/2021 8/23/2021 8/9/2021 7/26/2021 7/12/2021   Weight - 225 lb 230 lb 233 lb 235 lb 238 lb -   Neck Circ (inches) 16.25 - - - - - -   Waist Measure Inches 42 - - - - - -   Body Fat % 28.5 - - - - - 29.5   BMI - 33.23 kg/m2 33.97 kg/m2 34.41 kg/m2 34.7 kg/m2 35.15 kg/m2 -       Pounds loss since the last doctor appointment with our Center a month ago:  9 lbs  Total pounds loss since starting this therapeutic approach on start date: 33 lbs.    Is patient satisfied with his/her progress since the last appointment: yes  Factors contributing to weight change:  Eating less    SURGICAL HISTORY  Previous Weight Loss Surgery:  0 Date of Surgery and Surgeon:  0    No past surgical history on file. EATING HABITS  Total calories consumed per day:  0668-5751 kcal      Number of Meal replacements consumed per day:  3-5 [employer] New Direction food products  Negative Side Effects:  0   Are hunger, cravings and appetite controlled:  yes    Typical Meals:        Breakfast: veggie pasta or chicken       Lunch:  ND vanilla pudding or chicken       Dinner: ND vanilla pudding      Snacks: 0    Barriers to eating meals:  When I go to work - pt unable to further explain.   Does patient want to continue use of New Direction meal replacements:  yes    DRINKING HABITS  Average amount of water consumed daily: 90 oz/day  (Goal 2 L or 67 oz daily, minimally)  Amount of caffeine consumed daily: 1-2 cans 10 irene Red Bull on Thursdays when I work because they have it in the office  Sugar-sweetened beverages consumed daily (including alcohol, sodas, teas, juices, etc.): 0 except Red Bull at work , Use Skittles water enhancer  Barriers preventing patient from drinking minimum 2L water/day:  0     Social History     Tobacco Use    Smoking status: Never Smoker    Smokeless tobacco: Never Used   Vaping Use    Vaping Use: Never used   Substance Use Topics    Alcohol use: Yes     Comment: weekends, socially    Drug use: Not Currently         SLEEP HABITS  Total hours of sleep nightly: 8-10 hours (Goal 7-9 hours/nightly)  Rx or OTC Sleep Aids:  Rx Ambien and Melatonin  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   Work hours:  10a-4p on Thursdays And study [address] at home x 4 hours every day Night shift work: 0   Personal history of Sleep Apnea:  0 CPAP use:  0  Sleep Specialist:  na  Barriers to getting proper rest:  0     PHYSICAL ACTIVITY HISTORY  Type of physical activity: 0  Physical activity is performed 0 times a week for 0 minutes 0 times in the day  Enjoyment with physical activity:  sometimes  Pedometer or fitness tracker/Smartwatch/ Iphone use: yes, Fit   Average number steps per day:  ?,000 steps/day    Barriers limiting physical activity:  Weather is too hot    Mobile Apps used for meal planning, calorie counting, water consumption, sleep, or physical activity:  0     WEIGHT LOSS MEDICATION HISTORY  Current weight loss medication:  0   Weight loss medication prescribed by our office:  0      Current Outpatient Medications   Medication Sig    B.infantis-B.ani-B.long-B.bifi (Probiotic 4X) 10-15 mg TbEC Take  by mouth.  pantoprazole (PROTONIX) 40 mg tablet Take 40 mg by mouth daily.  escitalopram oxalate (LEXAPRO) 10 mg tablet Take 10 mg by mouth daily.  zolpidem CR (AMBIEN CR) 12.5 mg tablet Take 12.5 mg by mouth nightly.  MEN'S MULTI-VITAMIN PO Take 1 Tablet by mouth daily.  omega 3-dha-epa-fish oil (Fish Oil) 100-160-1,000 mg cap Take 2,000 mg by mouth daily.  cholecalciferol (Vitamin D3) 25 mcg (1,000 unit) cap Take 1,000 Units by mouth daily.  magnesium 250 mg tab Take 1 Tablet by mouth daily.  melatonin 5 mg cap capsule Take 5 mg by mouth nightly. No current facility-administered medications for this visit. Medications that cause weight gain:  Lexapro  Medications that cause weight loss:  0  Any changes to medications since last office visit with me:  I started a Rx medication for hair thinning by [facility] this month. Per chart review, Propecia for hair thinning. Pt states his dad lost his hair in his 29's. Pt has had hair thinning prior to starting [employer] New Direction LCD.      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 Gladys Jose, psychiatrist previously now sees pcp for Rx since stable - Lexapro 10 mg daily  Is mental health condition controlled: yes    Any current major lifestyle changes or stressors: \"It's not relevant\"     OTHER MEDICAL CARE SINCE LAST OFFICE VISIT  ? I don't rememer - maybe the dermatologist     ASSOCIATED MEDICAL CONDITIONS  Past Medical History:   Diagnosis Date    ADD (attention deficit disorder) 05/21/2015    Anxiety 05/21/2015    Dr. Siri Carver Asperger's syndrome 05/21/2015    Chronic insomnia     Patellar dislocation     Left    LIDIA (sensory integration disorder) 05/21/2015    Dr. Queenie Elmore at [facility]       Family History   Problem Relation Age of Onset    Cancer Father         non-Hodgkin's Lymphoma    Dementia Father     Stroke Maternal Grandmother 80    Osteoporosis Mother     Thyroid Disease Mother     No Known Problems Maternal Grandfather     No Known Problems Paternal Grandmother     No Known Problems Paternal Grandfather         gun shot wound    Obesity Maternal Aunt     Other Maternal Aunt         mood swings, sensory overload    Obesity Half-sibling         ALL ARE OBESE       OB/GYN HISTORY (For Female Patients)  Social History     Substance and Sexual Activity   Sexual Activity Not Currently    Partners: Female    Birth control/protection: Abstinence       Do you have upcoming travel in the next 6 weeks:  0. Patient will also notify the dietician for recommendations during travel.   Immunization History   Administered Date(s) Administered    COVID-19, PFIZER, MRNA, LNP-S, PF, 30MCG/0.3ML DOSE 03/12/2021, 04/09/2021       HEALTH MAINTENANCE  A1c:  SEE RESULTS BELOW     Lipids:  SEE RESULTS BELOW  Depression Screening:  Performed during [facility] Visit to [facility]  Colonoscopy:  na, if patient is over 49 yo  Mammogram:  na, if female patient is over 37 yo  Annual Wellness Exam:  To be performed by PCP, when appropriate. ROS  Pt denies hunger, cravings, lack of focus, fatigue, feeling weak, headaches, dizziness, light headedness, nausea, vomiting, diarrhea, constipation, indigestion, rapid heart rate, SOB, low blood sugar, feeling cold, hair loss, rash, fluid retention, leg aches, difficulty sleeping, irritability, mood swings, or other associated symptoms. Review of Systems negative except as noted above in HPI. PHYSICAL EXAMINATION    Visit Vitals  /86 (BP 1 Location: Left arm, BP Patient Position: Sitting)   Pulse 81   Temp 98 °F (36.7 °C) (Oral)   Resp 18   Ht 5' 9\" (1.753 m)   Wt 225 lb (102.1 kg)   SpO2 97%   BMI 33.23 kg/m²           Weight Metrics 9/20/2021 9/20/2021 9/3/2021 8/23/2021 8/9/2021 7/26/2021 7/12/2021   Weight - 225 lb 230 lb 233 lb 235 lb 238 lb -   Neck Circ (inches) 16.25 - - - - - -   Waist Measure Inches 42 - - - - - -   Body Fat % 28.5 - - - - - 29.5   BMI - 33.23 kg/m2 33.97 kg/m2 34.41 kg/m2 34.7 kg/m2 35.15 kg/m2 -          Neck Circumference:  Acceptable range for M >16 inches, F>15 inches  Waist Circumference:  Acceptable range for M> 40 inches/102 cm, F > 35 inches, 88 cm  Body Fat: Acceptable range for M 18-24%, F 25-31%    General appearance - Well nourished. Well appearing. Well developed. No acute distress. Obese. Head - Normocephalic. Atraumatic. Eyes - pupils equal and reactive. Extraocular eye movements intact. Sclera anicteric. Mildly injected sclera. Ears - Hearing is grossly normal bilaterally. Nose - normal and patent. No polyps noted. No erythema. No discharge.     Mouth - mucous membranes with adequate moisture. Posterior pharynx normal with cobblestone appearance. No erythema, white exudate or obstruction. Neck - supple. Midline trachea. No carotid bruits noted bilaterally. No thyromegaly noted. Chest - clear to auscultation bilaterally anteriorly and posteriorly. No wheezes. No rales or rhonchi. Breath sounds are symmetrical bilaterally. Unlabored respirations. Heart - normal rate. Regular rhythm. Normal S1, S2. No murmur noted. No rubs, clicks or gallops noted. Abdomen - soft and distended. No masses or organomegaly. No rebound, rigidity or guarding. Bowel sounds normal x 4 quadrants. No tenderness noted. Neurological - awake, alert and oriented to person, place, and time and event. Cranial nerves II through XII intact. Clear speech. Muscle strength is +5/5 x 4 extremities. Sensation is intact to light touch bilaterally. Steady gait. Heme/Lymph - peripheral pulses normal x 4 extremities. No peripheral edema is noted. Musculoskeletal - Intact x 4 extremities. Full ROM x 4 extremities. No pain with movement. Back exam - normal range of motion. No pain on palpation of the spinous processes in the cervical, thoracic, lumbar, sacral regions. No CVA tenderness. No buffalo hump noted. Skin - no rashes, erythema, ecchymosis, lacerations, abrasions, suspicious moles noted. No skin tags or moles. No acanthosis nigricans noted in the axilla or neck. Psychological -   normal behavior, dress and thought processes. Good insight. Good eye contact. Normal affect. Appropriate mood. Normal speech.     DATA REVIEWED    No results found for: WBC, WBCLT, HGBPOC, HGB, HGBP, HCTPOC, HCT, PHCT, RBCH, PLT, MCV, HGBEXT, HCTEXT, PLTEXT  Lab Results   Component Value Date/Time    Sodium 140 07/03/2021 08:13 AM    Potassium 5.0 07/03/2021 08:13 AM    Chloride 102 07/03/2021 08:13 AM    CO2 26 07/03/2021 08:13 AM    Glucose 90 07/03/2021 08:13 AM    BUN 19 07/03/2021 08:13 AM    Creatinine 0.98 07/03/2021 08:13 AM    BUN/Creatinine ratio 19 07/03/2021 08:13 AM    GFR est  07/03/2021 08:13 AM    GFR est non- 07/03/2021 08:13 AM    Calcium 10.0 07/03/2021 08:13 AM    Bilirubin, total 0.5 07/03/2021 08:13 AM    Alk. phosphatase 74 07/03/2021 08:13 AM    Protein, total 6.7 07/03/2021 08:13 AM    Albumin 4.6 07/03/2021 08:13 AM    A-G Ratio 2.2 07/03/2021 08:13 AM    ALT (SGPT) 35 07/03/2021 08:13 AM    AST (SGOT) 24 07/03/2021 08:13 AM     Lab Results   Component Value Date/Time    Hemoglobin A1c 4.9 07/03/2021 08:13 AM     Lab Results   Component Value Date/Time    TSH 1.000 07/03/2021 08:13 AM     No results found for: URICA, UAU1  No results found for: MG  No results found for: B12LT, FOL, RBCF  No results found for: VITD3, XQVID2, XQVID3, XQVID, VD3RIA, VEEW27XDLVH  No results found for: IRON, FE, TIBC, IBCT, PSAT, FERR  Lab Results   Component Value Date/Time    Cholesterol, total 197 07/03/2021 08:13 AM    HDL Cholesterol 36 (L) 07/03/2021 08:13 AM    LDL, calculated 131 (H) 07/03/2021 08:13 AM    VLDL, calculated 30 07/03/2021 08:13 AM    Triglyceride 169 (H) 07/03/2021 08:13 AM     EKG:  Not available     ASSESSMENT     ICD-10-CM ICD-9-CM    1. Class 1 obesity due to excess calories with serious comorbidity and body mass index (BMI) of 34.0 to 34.9 in adult  E66.09 278.00     Z68.34 V85.34    2. Weight loss  R63.4 783.21 URIC ACID    33 lbs due to LCD   3. Vitamin D deficiency  E55.9 268.9 VITAMIN D, 25 HYDROXY   4. Anxiety and depression  F41.9 300.00     F32.9 311     stable   5. High cholesterol  E78.00 272.0 INSULIN      LIPID PANEL      METABOLIC PANEL, COMPREHENSIVE      NMR LIPOPROFILE WITH LIPIDS (WITHOUT GRAPH)   6. LIDIA (sensory integration disorder)  F88 315.8     stable w LCD   7. Asperger's syndrome  F84.5 299.80    8. Attention deficit disorder (ADD) without hyperactivity  F98.8 314.00    9. Chronic insomnia  F51.04 780.52     resolved w Rx Ambien and Melatonin   10.  Family history of hyperthyroidism  Z83.49 V18.19    11. Family history of disuse osteoporosis  Z82.62 V17.81    12. Family history of non-Hodgkin's lymphoma  Z80.7 V16.7    13. Family history of abdominal aortic aneurysm (AAA)  Z82.49 V17.49    15. Family history of stroke  Z82.3 V17.1    15. BMI 33.0-33.9,adult  Z68.33 V85.33        We discussed the expected course, resolution and complications of the diagnosis(es) in detail. WEIGHT MANAGEMENT PLAN    Chart was reviewed and updated during the office visit today. Eder Estrella has fulfilled [facility] program requirements this month by completing homework forms, attending educational classes, nurse triage visits and monthly provider appointments as agreed and remains in good standing. Reviewed and appreciate registered dietician note for nutritional counseling. Patient has attended all requirements of the program over the past month and is in good standing. Reviewed and appreciate bi-monthly nurse visits and agree with documentation. Followed up on any patient concerns today. Emphasized the importance of the patient continuing care from current primary care provider and other specialists while participating in this weight management program.    Patient has full access to all our office notes, orders, lab results on [employer] and can provide them copies, if desired. Advised patient to follow up with pcp for any acute symptoms and Health Maintenance. Reviewed and discussed most recent lab results. Informed patient that an EKG will be performed for every 50 lbs of weight loss. Recheck pertinent labs every 3 months while participating in LCD using [facility] meal replacements, as discussed to identify electrolyte abnormalities and guide therapeutic approach. An order form for pertinent labs was given to patient today. Patient was advised to have the labwork performed 1 week prior to that appointment with me.     Advised patient to sign up for Phyllis and view lab results directly. Notify me by [address] if any questions or concerns arise. Labs ordered today will be reviewed in detail at the next office visit and time allowed for any questions regarding the results. Discussed the patient's medications, BMI, anthropometric measures and goals with patient. The weight management follow up plan is as follows:     Continue current medications as directed by prescribers. Identified and discussed medications the patient is taking that can cause weight gain or weight loss as recorded on patient's medication list.  Advised patient not to stop use of any without discussing with the prescribing provider. Recommended patient to ask prescribing providers to consider more weight neutral or weight negative options. Will monitor patient's weight and health with continued use of current medications. Supplement recommendations: Take OTC Magnesium 400 mg po at night prn sleep, muscle cramping, constipation. Take OTC vit B12 1000 mcg daily orally if taking Metformin or experiencing numbness and tingling. Take OTC Fish Oil 1000 mg with EPA and -1,000 mg daily if experiencing dry skin, low HDL. Use with caution if history of heartburn exists. Increase fiber products (I.e. fiber tea, fiber shakes) or try OTC Fiber products (I.e. Benefiber, Metamucil, psyllium, etc) if experiencing constipation. Take OTC Lactaid with meal replacements, if lactose intolerance history exists or symptoms begin. Referred patient to [facility] New Direction Patient Manual for recommended antidotes. If any new symptoms or side effects have been experienced once dietary approach is initiated, advised patient to notify our office.     Dietary Prescription:    Recommended meal plan:  [employer]irection Low Calorie Dietary approach, 2311-2317 kcal/day, 2-3 meal replacements daily with 1 \"grocery\" meal.   Make sure proper daily calories are consumed for each meal.  Encouraged patient to stay within the recommended amount of calories per day   Reviewed nutrition and importance of regular protein intake and minimizing hidden carbohydrate sources. Decrease simple carbohydrates in any regular meal allowed (white foods, sweet foods, sweet drinks and alcohol), increase green leafy vegetables and protein (lean meats and beans) with each meal.    Avoid fried foods and limit saturated fats. Do not skip meals. Eat meals within 3-4 hours to prevent low blood sugar events. Emphasized importance of drinking a minimum of 2 L (67 oz) of water daily up to half your body weight in ounces of water while utilizing this dietary approach to prevent dehydration and potential side effects. Avoid water enhancers or sugar-sweetened beverages including alcohol, juice, soda, lemonade. Try OTC water infusion recipes. Limit caffeine, will allow 1 8 oz cup of black coffee or green tea (to stimulate release of Adiponectin and promote fat burning.)     Exercise Prescription:   Emphasized importance of reducing sedentary time and performing physical activity at least 5 days a week to prevent muscle mass loss. A goal of 30 minutes physical activity daily is recommended for health benefit and at least 60 minutes daily to prevent weight regain. During weight loss phase, no less than 75% of this time needs to be performing aerobic (i.e. Walking) activity with no more than 25% of the time performing resistance exercises (i.e. Weight lifting, strengthening, toning). During weight maintenance phase, 50% of the physical activity time needs to be spent performing aerobic activity with 50% of the total time performing resistance exercises. Sleep Prescription:   A goal of 7-9 hours nightly of uninterrupted sleep is recommended to turn off the Grehlin hormone to be released from the stomach and triggers appetite while promoting weight gain.  Proper rest turns on Leptin hormone to be released from white adipose tissue and promotes weight loss. Avoid alcohol and caffeine 6-12 hours before bedtime to reduce risk of sleep disturbance and bladder irritation while asleep. Discussed snoring, symptoms of Sleep Apnea and improvements with weight loss. Strongly encouraged compliance with CPAP, if Sleep Apnea has been diagnosed. Advised patient to follow up with sleep specialist for every 25 pounds lost to see if CPAP settings need to be adjusted. Counseled patient on health topics:    Effects of physical activity on preventing re-gain of weight/reducing waist circumference, Obesity, Insulin Resistance, LCD dietary approaches, benefits, contraindications, possible side effects to these diets and how to prevent poor outcomes (including staying hydrated, limit physical exercise while transitioning into this program, avoid skipping meals, etc), weight loss goals, sleep hygiene, barriers to losing weight and keeping weight off, strategies to overcome habits or challenges to approve or continue adherence and stressors. Informed patient that weight loss goal of 5-10% in 6-12 months has shown significant improvement in obesity and its related health conditions including DM, heart disease, asthma. A key study published in Arthritis & Rheumatism of Overweight and Obese Adults with OA found that losing 1 pound of weight resulted in 4 pounds of pressure being removed from the knees. Immunizations noted. Influenza and Covid-19 vaccines recommended, if patient has not had it. Obesity may increase risk for severe illness and death from Covid-19 disease. Offered empathy, encouragement, legitimation, prayers, partnership to patient. Praised patient for successes. Patient was offered a choice/choices in the treatment plan today. Patient expressed understanding with the diagnoses and the plan and agrees with recommendations. Return in about 1 month (around 10/20/2021) for ND LCD, results.      Total time:  30 mins spent in counseling, coordinating care, reviewing and discussing results, reviewing relevant documentation from other providers, completing relevant documentation, and discussing treatment plans in reference to The primary encounter diagnosis was Class 1 obesity due to excess calories with serious comorbidity and body mass index (BMI) of 34.0 to 34.9 in adult. Diagnoses of Weight loss, Anxiety and depression, LIDIA (sensory integration disorder), Chronic insomnia, Vitamin D deficiency, High cholesterol, Family history of hyperthyroidism, Family history of disuse osteoporosis, Family history of non-Hodgkin's lymphoma, Family history of abdominal aortic aneurysm (AAA), and Family history of stroke were also pertinent to this visit. [address], MD FOR ALLOWING ME THE PRIVILEGE TO PARTICIPATE IN THE CARE OF OUR MUTUAL PATIENT, Mr. Kay Wallis. Ada Salmon DO, Diplomate [employer]    There are no Patient Instructions on file for this visit.

## 2021-09-20 NOTE — Clinical Note
Great news, team!    Mr. Clara Angulo has reached his goal ahead of schedule!!!! He wanted to be 228 lbs by October. He reached 225 lbs today and wants to keep going now! Hooray!!!! Thanks for your hard work!   Great job, team!!!    Dr. Luisito Chiang

## 2021-10-01 NOTE — PROGRESS NOTES
Travon Baez presents for weekly or bi-monthly evaluation of Obesity Body mass index is 33.97 kg/m². Visit Vitals  /74 (BP 1 Location: Left arm, BP Patient Position: Sitting)   Pulse 72   Resp 18   Ht 5' 9\" (1.753 m)   Wt 230 lb (104.3 kg)   SpO2 98%   BMI 33.97 kg/m²       Wt Readings from Last 3 Encounters:   09/20/21 225 lb (102.1 kg)   09/03/21 230 lb (104.3 kg)   08/23/21 233 lb (105.7 kg)       1. Class 3 severe obesity due to excess calories without serious comorbidity in adult, unspecified BMI ([address]         I have reviewed and agree with nurse documentation of Weekly Education Class nurse progress note for [facility] [place]). Travon Baez is compliant with program requirements and may continue participation utilizing the New Direction meal replacements, as discussed. Patient has been advised to refer to the [facility] Patient Manual and notify us if any side effects to this meal plan are present and/or persist.  Travon Baez may continue the current dietary approach, care and follow up at the monthly office visit with the provider, as scheduled. Follow-up and Dispositions    · Return in about 2 weeks (around 9/17/2021) for weight. Documentation true and accepted by Thomas Burleson.  Terrell Snyder., AOBFP, Diplomate [employer]

## 2021-10-08 ENCOUNTER — CLINICAL SUPPORT (OUTPATIENT)
Dept: SURGERY | Age: 26
End: 2021-10-08

## 2021-10-08 VITALS
HEART RATE: 65 BPM | HEIGHT: 69 IN | WEIGHT: 217.8 LBS | RESPIRATION RATE: 18 BRPM | OXYGEN SATURATION: 98 % | SYSTOLIC BLOOD PRESSURE: 100 MMHG | DIASTOLIC BLOOD PRESSURE: 70 MMHG | BODY MASS INDEX: 32.26 KG/M2

## 2021-10-08 DIAGNOSIS — E66.9 OBESITY, CLASS I, BMI 30-34.9: Primary | ICD-10-CM

## 2021-10-08 NOTE — PROGRESS NOTES
1. Have you been to the ER, urgent care clinic since your last visit? Hospitalized since your last visit? No    2. Have you seen or consulted any other health care providers outside of the [facility] since your last visit? Include any pap smears or colon screening. No     Week #1      Progress Note: Weekly Education Class in the [facility]         Patient is on Very Low Calorie Diet [] (4 meal replacements per day, 800 kcal/day)      Low Calorie Diet [x] (2-3 meal replacements per day, 5896-6197 kcal/day)    1) Did patient have any new symptoms or physical problems? Yes []    No [x]    If yes, check & comment: weakness [], fatigue [], lightheadedness [], headache [], cramps [], cold intolerance [], hair loss [], diarrhea [], constipation [],  NA [] other:                                 2) Has patient had any medical attention from other providers, urgent care or the emergency room this week? Yes []  No [x]       NA [], If yes, why:                                      3) Any other sugar sweetened beverages consumed this week? Yes []  No [x]    4) Did patient have any problems adhering to the diet? Yes []  No [x] NA []    If yes, Vacation [], Celebrations [], Conferences [], Family Reunions [] other:                                               5) How many hours of sleep this week? 8    (range)  NA []    Number of meal replacements consumed daily? 1 (range)  NA []    Average ounces of water patient consumed daily this week (not including shakes)? 13     (divide the weekly total by 7)    Did you eat any food outside of the program? Yes [x] No []    Physical Activity Over the Past Week:    Cardio exercise: 0 min  Strength exercise 0 workouts / week  Number of steps walked per day: 0    How has patient mood overall been this week?  Sad [], Happy [], Stressed [], Tired [], Content [], NA [], other Great           Medications reconciled by nurse Yes [x]  No[]    Patient was given therapeutic recommendations for any noted side effects of their dietary approach based upon [facility] patient manual per providers recommendation. Week # 2      Progress Note: Weekly Education Class in the [facility] Weight Loss Program         Patient is on Very Low Calorie Diet [] (4 meal replacements per day, 800 kcal/day)      Low Calorie Diet [x] (2-3 meal replacements per day, 3944-5930 kcal/day)    1) Did patient have any new symptoms or physical problems? Yes []    No [x]    If yes, check & comment: weakness [], fatigue [], lightheadedness [], headache [], cramps [], cold intolerance [], hair loss [], diarrhea [], constipation [],  NA [] other:                                 2) Has patient had any medical attention from other providers, urgent care or the emergency room this week? Yes []  No [x]       NA [], If yes, why:                                      3) Any other sugar sweetened beverages consumed this week? Yes []  No [x]    4) Did patient have any problems adhering to the diet? Yes []  No [x] NA []    If yes, Vacation [], Celebrations [], Conferences [], Family Reunions [] other:                                                5) How many hours of sleep this week? 8    (range)  NA []    Number of meal replacements consumed daily? 1  (range)  NA []    Average ounces of water patient consumed daily this week (not including shakes)? 1     (divide the weekly total by 7)    Did you eat any food outside of the program? Yes [x] No []    Physical Activity Over the Past Week:    Cardio exercise: 0  min  Strength exercise: 0 workouts / week  Number of steps walked per day: 0    How has patient mood overall been this week?  Sad [], Happy [], Stressed [], Tired [], Content [], NA [], other Great           Medications reconciled by nurse Yes [x]  No[]    Patient was given therapeutic recommendations for any noted side effects of their dietary approach based upon [facility] patient manual per providers recommendation.

## 2021-10-22 ENCOUNTER — CLINICAL SUPPORT (OUTPATIENT)
Dept: SURGERY | Age: 26
End: 2021-10-22

## 2021-10-22 VITALS
DIASTOLIC BLOOD PRESSURE: 68 MMHG | BODY MASS INDEX: 31.64 KG/M2 | WEIGHT: 213.6 LBS | HEART RATE: 81 BPM | RESPIRATION RATE: 18 BRPM | SYSTOLIC BLOOD PRESSURE: 104 MMHG | OXYGEN SATURATION: 97 % | HEIGHT: 69 IN

## 2021-10-22 DIAGNOSIS — E66.9 OBESITY, CLASS I, BMI 30-34.9: Primary | ICD-10-CM

## 2021-10-22 NOTE — PROGRESS NOTES
Weight Management. 1. Have you been to the ER, urgent care clinic since your last visit? Hospitalized since your last visit? No    2. Have you seen or consulted any other health care providers outside of the [address] [place] [address] since your last visit? Include any pap smears or colon screening. No     Week # 1      Progress Note: Weekly Education Class in the [facility]         Patient is on Very Low Calorie Diet [] (4 meal replacements per day, 800 kcal/day)      Low Calorie Diet [x] (2-3 meal replacements per day, 0847-3906 kcal/day)    1) Did patient have any new symptoms or physical problems? Yes []    No [x]    If yes, check & comment: weakness [], fatigue [], lightheadedness [], headache [], cramps [], cold intolerance [], hair loss [], diarrhea [], constipation [],  NA [] other:                                 2) Has patient had any medical attention from other providers, urgent care or the emergency room this week? Yes []  No [x]       NA [], If yes, why:                                       3) Any other sugar sweetened beverages consumed this week? Yes []  No [x]    4) Did patient have any problems adhering to the diet? Yes []  No [x] NA []    If yes, Vacation [], Celebrations [], Conferences [], Family Reunions [] other:                                                5) How many hours of sleep this week? 9    (range)  NA []    Number of meal replacements consumed daily? 4-5 (range)  NA []    Average ounces of water patient consumed daily this week (not including shakes)? 90     (divide the weekly total by 7)    Did you eat any food outside of the program? Yes [x] No []    Physical Activity Over the Past Week:    Cardio exercise: Boxing min  Strength exercise: 0 workouts / week  Number of steps walked per day: 0    How has patient mood overall been this week?  Sad [], Happy [], Stressed [], Tired [], Content [], NA [], other Fine           Medications reconciled by nurse Yes [x]  No[]    Patient was given therapeutic recommendations for any noted side effects of their dietary approach based upon [facility] patient manual per providers recommendation. Week # 2      Progress Note: Weekly Education Class in the [facility] Weight Loss Program         Patient is on Very Low Calorie Diet [] (4 meal replacements per day, 800 kcal/day)      Low Calorie Diet [x] (2-3 meal replacements per day, 2331-2485 kcal/day)    1) Did patient have any new symptoms or physical problems? Yes []    No [x]    If yes, check & comment: weakness [], fatigue [], lightheadedness [], headache [], cramps [], cold intolerance [], hair loss [], diarrhea [], constipation [],  NA [] other:                                 2) Has patient had any medical attention from other providers, urgent care or the emergency room this week? Yes []  No [x]       NA [], If yes, why:                                      3) Any other sugar sweetened beverages consumed this week? Yes []  No [x]    4) Did patient have any problems adhering to the diet? Yes []  No [x] NA []    If yes, Vacation [], Celebrations [], Conferences [], Family Reunions [] other:                                                5) How many hours of sleep this week? 9    (range)  NA []    Number of meal replacements consumed daily? 4-5 (range)  NA []    Average ounces of water patient consumed daily this week (not including shakes)? 90     (divide the weekly total by 7)    Did you eat any food outside of the program? Yes [x] No []    Physical Activity Over the Past Week:    Cardio exercise: 0 min  Strength exercise: 0 workouts / week  Number of steps walked per day: 0    How has patient mood overall been this week?  Sad [], Happy [], Stressed [], Tired [], Content [], NA [], other Fine           Medications reconciled by nurse Yes [x]  No[]    Patient was given therapeutic recommendations for any noted side effects of their dietary approach based upon [employer] patient manual per providers recommendation.

## 2021-11-01 PROBLEM — E66.9 OBESITY, CLASS I, BMI 30-34.9: Status: ACTIVE | Noted: 2021-11-01

## 2021-11-01 NOTE — PROGRESS NOTES
Jewel Stack presents for weekly or bi-monthly evaluation of Obesity Body mass index is 32.16 kg/m². Visit Vitals  /70   Pulse 65   Resp 18   Ht 5' 9\" (1.753 m)   Wt 217 lb 12.8 oz (98.8 kg)   SpO2 98%   BMI 32.16 kg/m²       Wt Readings from Last 3 Encounters:   10/22/21 213 lb 9.6 oz (96.9 kg)   10/08/21 217 lb 12.8 oz (98.8 kg)   09/20/21 225 lb (102.1 kg)       1. Obesity, Class I, BMI 30-34.9         I have reviewed and agree with nurse documentation of Weekly Education Class nurse progress note for [facility] Weight [facility] [place]). Jewel Stack is compliant with program requirements and may continue participation utilizing the [employer] meal replacements, as discussed. Patient has been advised to refer to the [facility] Patient Manual and notify us if any side effects to this meal plan are present and/or persist.  Jewel Stack may continue the current dietary approach, care and follow up at the monthly office visit with the provider, as scheduled. Follow-up and Dispositions    · Return in about 2 weeks (around 10/22/2021). Documentation true and accepted by London Walker.  Syl Alejandra., AOBFP, [employer]

## 2021-11-01 NOTE — PROGRESS NOTES
Mack Peacock presents for weekly or bi-monthly evaluation of Obesity Body mass index is 31.54 kg/m². Visit Vitals  /68 (BP 1 Location: Right arm, BP Patient Position: Sitting, BP Cuff Size: Adult long)   Pulse 81   Resp 18   Ht 5' 9\" (1.753 m)   Wt 213 lb 9.6 oz (96.9 kg)   SpO2 97%   BMI 31.54 kg/m²       Wt Readings from Last 3 Encounters:   10/22/21 213 lb 9.6 oz (96.9 kg)   10/08/21 217 lb 12.8 oz (98.8 kg)   09/20/21 225 lb (102.1 kg)       1. Obesity, Class I, BMI 30-34.9         I have reviewed and agree with nurse documentation of Weekly Education Class nurse progress note for [facility] Weight 45 [facility] [place]). Mack Peacock is compliant with program requirements and may continue participation utilizing the [employer] meal replacements, as discussed. Patient has been advised to refer to the [facility] Patient Manual and notify us if any side effects to this meal plan are present and/or persist.  Mack Peacock may continue the current dietary approach, care and follow up at the monthly office visit with the provider, as scheduled. Follow-up and Dispositions    · Return in about 2 weeks (around 11/5/2021). Documentation true and accepted by Gloria Frances., AOBFP, Dipl[employer]

## 2021-11-03 ENCOUNTER — VIRTUAL VISIT (OUTPATIENT)
Dept: SURGERY | Age: 26
End: 2021-11-03
Payer: MEDICARE

## 2021-11-03 ENCOUNTER — CLINICAL SUPPORT (OUTPATIENT)
Dept: SURGERY | Age: 26
End: 2021-11-03

## 2021-11-03 VITALS
RESPIRATION RATE: 18 BRPM | OXYGEN SATURATION: 97 % | SYSTOLIC BLOOD PRESSURE: 124 MMHG | BODY MASS INDEX: 31.67 KG/M2 | DIASTOLIC BLOOD PRESSURE: 62 MMHG | HEIGHT: 69 IN | WEIGHT: 213.8 LBS | HEART RATE: 65 BPM

## 2021-11-03 DIAGNOSIS — F51.04 CHRONIC INSOMNIA: ICD-10-CM

## 2021-11-03 DIAGNOSIS — E66.9 OBESITY, CLASS I, BMI 30-34.9: Primary | ICD-10-CM

## 2021-11-03 DIAGNOSIS — F88 SID (SENSORY INTEGRATION DISORDER): ICD-10-CM

## 2021-11-03 DIAGNOSIS — F84.5 ASPERGER'S SYNDROME: ICD-10-CM

## 2021-11-03 DIAGNOSIS — F32.A ANXIETY AND DEPRESSION: ICD-10-CM

## 2021-11-03 DIAGNOSIS — E78.00 HIGH CHOLESTEROL: ICD-10-CM

## 2021-11-03 DIAGNOSIS — E55.9 VITAMIN D DEFICIENCY: ICD-10-CM

## 2021-11-03 DIAGNOSIS — F98.8 ATTENTION DEFICIT DISORDER (ADD) WITHOUT HYPERACTIVITY: ICD-10-CM

## 2021-11-03 DIAGNOSIS — F41.9 ANXIETY AND DEPRESSION: ICD-10-CM

## 2021-11-03 PROCEDURE — 99213 OFFICE O/P EST LOW 20 MIN: CPT | Performed by: FAMILY MEDICINE

## 2021-11-03 NOTE — Clinical Note
Rosa Sen! When pt comes in for triage, please print his labs from 09/2021 and ask him to have them drawn 1 week before he sees me next month. Thank you.

## 2021-11-03 NOTE — PROGRESS NOTES
[address] 22.  [phone] o  350 [facility] VISIT    Patient Name:  Kate Bedolla, 1995  PCP:  Adele Randhawa MD    Method of Delivery:  Video  Platform:  Doxy  My location:  Home Office                Patient location:  home    Patient's weight management approach:  LCD    Kate Bedolla is a 27 y.o. male with Obesity Class 1 There is no height or weight on file to calculate BMI. and associated health concerns was seen by synchronous (real-time) audio-video technology on today for Weight Management   Patient has been participating in the [employer] Management Program since 06/03/21 using the [employer] New Direction Low Calorie Diet (LCD, 9533-2710 kcal/day.)    Challenges adhering to this plan over the past month:  no    Patient goals for participation in this weight management program:   228 lbs in 4 months by 10/2021 (Achieved today! !!!)  Now pt wants to continue the same rate of weight loss for the next 6 months (03/2022 11 lbs/month avg) lbs or to lose and get in the acceptable range of body fat    Anthropometric Measures Previously    Start Weight:   258 lbs 0 oz   BMI:   38.10 kg/m2   Neck circumference:  17.7 in   Waist circumference:  50.5 in  Body Fat Percentage:  31.7 %     Weight at time of last appointment on 09/20/21:   225 lbs 0 oz        Anthropometric Measures Self-Reported Today  Today's Weight:   213 lbs 0 oz, 12 lbs down since last appointment in our Center.     Weight Metrics 10/22/2021 10/8/2021 9/20/2021 9/20/2021 9/3/2021 8/23/2021 8/9/2021   Weight 213 lb 9.6 oz 217 lb 12.8 oz - 225 lb 230 lb 233 lb 235 lb   Neck Circ (inches) - - 16.25 - - - -   Waist Measure Inches - - 42 - - - -   Body Fat % - - 28.5 - - - -   BMI 31.54 kg/m2 32.16 kg/m2 - 33.23 kg/m2 33.97 kg/m2 34.41 kg/m2 34.7 kg/m2       Total pounds loss since starting this therapeutic approach: 45 lbs   Contributing factors to weight change:  Eating 2788-7343 kcal/day and nothing more  Is patient satisfied with his/her progress since the last appointment:  yes      SURGICAL HISTORY  Previous Weight Loss Surgery:  0     No past surgical history on file. EATING HABITS  Total calories consumed per day:  3099-8758 kcal  Number of meals consumed from a restaurant per week this month:  0  Typical Meals:        Breakfast: chicken nuggets (5 from [employer])       Lunch:  ND vanilla pudding       Dinner: ND vanilla pudding or veggie pasta or chicken nuggets (5 at 300 calories)      Snacks: 2 ND vanilla puddings q 3-4 h      Number of Meal replacements consumed per day:  3-4 [employer] New Direction food products  Negative Side Effects:  0   Are appetite, hunger and cravings controlled:  yes  Does patient want to continue use of New Direction meal replacements:  yes  Barriers to eating meals:  0    DRINKING HABITS  Average amount of water consumed daily: 90 oz/day  (Goal 2 L or 67 oz daily, minimally).   Use Skittles water enhancer  Amount of caffeine consumed daily: 0 -stopped Red Bull  Sugar-sweetened beverages consumed daily (including alcohol, sodas, teas, juices, etc.): 0  Barriers to drinking minimum 2L water/day:  0   Social History     Tobacco Use    Smoking status: Never Smoker    Smokeless tobacco: Never Used   Vaping Use    Vaping Use: Never used   Substance Use Topics    Alcohol use: Yes     Comment: weekends, socially    Drug use: Not Currently         SLEEP HABITS  Total hours of sleep nightly: 8-9 hours (Goal 7-9 hours/nightly)  Rx or OTC Sleep Aids:  Rx Ambien and Melatonin  Occupation:   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                       Work hours:  10a-4p on Thursdays           And study [address] at home x 4 hours every day       Night shift work: 0      Personal history of Sleep Apnea:  0           CPAP use:  0              Sleep Specialist:  na  Barriers to getting proper rest:  0     PHYSICAL ACTIVITY HISTORY  Type of physical activity:  Walking, VR boxing  Physical activity is performed 4-5 times a week for 20-30 minutes 1 times in the day  Enjoyment with physical activity:  yes  Barriers limiting physical activity:  0    Mobile Apps used for meal planning, calorie counting, water consumption, sleep, or physical activity:  0     WEIGHT LOSS MEDICATION HISTORY  Current prescription weight loss medication:  0   Any weight loss medication prescribed by our office:  0    Outpatient Medications Marked as Taking for the 11/3/21 encounter (Virtual Visit) with Belle Lee, DO   Medication Sig Dispense Refill    MINOXIDIL PO Take 1 Tablet by mouth daily.  B.infantis-B.ani-B.long-B.bifi (Probiotic 4X) 10-15 mg TbEC Take  by mouth.  pantoprazole (PROTONIX) 40 mg tablet Take 40 mg by mouth daily.  escitalopram oxalate (LEXAPRO) 10 mg tablet Take 10 mg by mouth daily.  zolpidem CR (AMBIEN CR) 12.5 mg tablet Take 12.5 mg by mouth nightly.  MEN'S MULTI-VITAMIN PO Take 1 Tablet by mouth daily.  omega 3-dha-epa-fish oil (Fish Oil) 100-160-1,000 mg cap Take 2,000 mg by mouth daily.  cholecalciferol (Vitamin D3) 25 mcg (1,000 unit) cap Take 1,000 Units by mouth daily.  magnesium 250 mg tab Take 1 Tablet by mouth daily.  melatonin 5 mg cap capsule Take 5 mg by mouth nightly.        Medications that cause weight gain:  Minoxidil, Lexapro  Medications that cause weight loss:  0  Changes to medications since last office visit with me:  0    Allergies   Allergen Reactions    Caffeine Other (comments)     THROAT TIGHTENING    Suprax [Cefixime] Unknown (comments)     As a child       BEHAVIORAL HEALTH HISTORY  DEPRESSION SCREENING:    3 most recent PHQ Screens 7/12/2021   Little interest or pleasure in doing things Not at all   Feeling down, depressed, irritable, or hopeless Not at all   Total Score PHQ 2 0 Any history of mental health conditions (including Depression, Anxiety, Anorexia, Bulimia or Binge Eating disorder): ISAIAH w MAD, ADD, Asperger's, \"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 Cindy Richardson, psychiatrist previously now sees pcp for Rx since stable - Lexapro 10 mg daily  Is mental health condition controlled: yes    OTHER MEDICAL CARE SINCE LAST OFFICE VISIT  I no longer see Dr. Waqar Maya. My pcp rxs Lexapro 10 mg for MAD, ISAIAH. Tolerating well. Working well. ASSOCIATED MEDICAL CONDITIONS  Past Medical History:   Diagnosis Date    ADD (attention deficit disorder) 05/21/2015    Anxiety 05/21/2015    Dr. Yahaira Sellers Asperger's syndrome 05/21/2015    Chronic insomnia     Patellar dislocation     Left    LIDIA (sensory integration disorder) 05/21/2015    Dr. Vitaliy Dove at [facility], [facility] genetics       Family History   Problem Relation Age of Onset    Cancer Father         non-Hodgkin's Lymphoma    Dementia Father     Stroke Maternal Grandmother 80    Osteoporosis Mother     Thyroid Disease Mother     No Known Problems Maternal Grandfather     No Known Problems Paternal Grandmother     No Known Problems Paternal Grandfather         gun shot wound    Obesity Maternal Aunt     Other Maternal Aunt         mood swings, sensory overload    Obesity Half-sibling         ALL ARE OBESE         OB/GYN HISTORY (For Female Patients)  Social History     Substance and Sexual Activity   Sexual Activity Not Currently    Partners: Female    Birth control/protection: Abstinence       Do you have upcoming travel in the next 6 weeks:  0. Patient will also notify the dietician for recommendations during travel.   Immunization History   Administered Date(s) Administered    COVID-19, PFIZER, MRNA, LNP-S, PF, 30MCG/0.3ML DOSE 03/12/2021, 04/09/2021       Health Maintenance   A1c:  SEE RESULTS BELOW     Lipids:  SEE RESULTS BELOW  Depression Screening:  Performed during [facility] to [facility]  Colonoscopy:  na, for patients over 47 yo  Mammogram:  na, for female patients over 37 yo  Annual Wellness Exam:  To be performed by PCP, when appropriate. ROS    Pt denies hunger, cravings, lack of focus, fatigue, feeling weak, headaches, dizziness, light headedness, nausea, vomiting, diarrhea, constipation, indigestion, rapid heart rate, SOB, low blood sugar, feeling cold, hair loss, rash, fluid retention, leg aches, difficulty sleeping, irritability, mood swings, or other associated symptoms. Review of Systems negative except as noted above in HPI. PHYSICAL EXAMINATION  (Limited due to being a telehealth encounter)  Self-reported Vital Signs:  Patient-Reported Vitals 8/18/2021   Patient-Reported Weight 234 lbs          Weight Metrics 10/22/2021 10/8/2021 9/20/2021 9/20/2021 9/3/2021 8/23/2021 8/9/2021   Weight 213 lb 9.6 oz 217 lb 12.8 oz - 225 lb 230 lb 233 lb 235 lb   Neck Circ (inches) - - 16.25 - - - -   Waist Measure Inches - - 42 - - - -   Body Fat % - - 28.5 - - - -   BMI 31.54 kg/m2 32.16 kg/m2 - 33.23 kg/m2 33.97 kg/m2 34.41 kg/m2 34.7 kg/m2          Neck Circumference:  Acceptable range for M >16 inches, F>15 inches  Waist Circumference:  Acceptable range for M> 40 inches/102 cm, F > 35 inches, 88 cm  Body Fat: Acceptable range for M 18-24%, F 25-31%    General appearance - Well nourished. Well appearing. Well developed. No acute distress. Obese. Head - Normocephalic. Atraumatic. Eyes - Extraocular eye movements intact. Sclera anicteric. Ears - Hearing is grossly normal bilaterally. Nose - normal and patent. No discharge. No nasal flaring noted. Mouth - oral mucous membranes with adequate moisture. Neck -   Midline trachea. No neck masses noted. No neck retractions noted. Chest - Unlabored respirations. Symmetrical chest.  No conversational dyspnea noted. Heart - normal rate.     Neurological - awake, alert and oriented to person, place, and time and event. Cranial nerves II through XII intact. Clear speech. Steady gait. Musculoskeletal - Intact x 4 extremities. No pain with movement. Psychological -   normal behavior, dress and thought processes. Good insight. Good eye contact. Flat affect. Appropriate but impatient mood. Normal speech w monotone voice. DATA REVIEWED    No results found for: WBC, WBCLT, HGBPOC, HGB, HGBP, HCTPOC, HCT, PHCT, RBCH, PLT, MCV, HGBEXT, HCTEXT, PLTEXT  Lab Results   Component Value Date/Time    Sodium 140 07/03/2021 08:13 AM    Potassium 5.0 07/03/2021 08:13 AM    Chloride 102 07/03/2021 08:13 AM    CO2 26 07/03/2021 08:13 AM    Glucose 90 07/03/2021 08:13 AM    BUN 19 07/03/2021 08:13 AM    Creatinine 0.98 07/03/2021 08:13 AM    BUN/Creatinine ratio 19 07/03/2021 08:13 AM    GFR est  07/03/2021 08:13 AM    GFR est non- 07/03/2021 08:13 AM    Calcium 10.0 07/03/2021 08:13 AM    Bilirubin, total 0.5 07/03/2021 08:13 AM    Alk. phosphatase 74 07/03/2021 08:13 AM    Protein, total 6.7 07/03/2021 08:13 AM    Albumin 4.6 07/03/2021 08:13 AM    A-G Ratio 2.2 07/03/2021 08:13 AM    ALT (SGPT) 35 07/03/2021 08:13 AM    AST (SGOT) 24 07/03/2021 08:13 AM     Lab Results   Component Value Date/Time    Hemoglobin A1c 4.9 07/03/2021 08:13 AM     Lab Results   Component Value Date/Time    TSH 1.000 07/03/2021 08:13 AM     No results found for: URICA, UAU1  No results found for: MG  No results found for: B12LT, FOL, RBCF  No results found for: VITD3, XQVID2, XQVID3, XQVID, VD3RIA, ZRMS75ZZCQC  No results found for: IRON, FE, TIBC, IBCT, PSAT, FERR  Lab Results   Component Value Date/Time    Cholesterol, total 197 07/03/2021 08:13 AM    HDL Cholesterol 36 (L) 07/03/2021 08:13 AM    LDL, calculated 131 (H) 07/03/2021 08:13 AM    VLDL, calculated 30 07/03/2021 08:13 AM    Triglyceride 169 (H) 07/03/2021 08:13 AM   )  No results found for this or any previous visit. EKG:  Not available    ASSESSMENT     ICD-10-CM ICD-9-CM    1. Obesity, Class I, BMI 30-34.9  E66.9 278.00    2. Vitamin D deficiency  E55.9 268.9    3. Anxiety and depression  F41.9 300.00     F32. A 311    4. High cholesterol  E78.00 272.0    5. LIDIA (sensory integration disorder)  F88 315.8    6. Asperger's syndrome  F84.5 299.80    7. Attention deficit disorder (ADD) without hyperactivity  F98.8 314.00    8. Chronic insomnia  F51.04 780.52    9. BMI 31.0-31.9,adult  Z68.31 V85.31        We discussed the expected course, resolution and complications of the diagnosis(es) in detail. WEIGHT MANAGEMENT PLAN  Agree with nurse documentation. Chart was reviewed and updated during the office visit today. Shaneka Andujar has fulfilled [facility] program requirements this month by completing homework forms, attending educational classes, nurse triage visits and monthly provider appointments as agreed and remains in good standing. Reviewed and appreciate registered dietician note for nutritional counseling. Patient has attended all requirements of the program over the past month and is in good standing. Reviewed and appreciate bimonthly nurse visits and agree with documentation. Followed up on any patient concerns today. Emphasized the importance of the patient continuing care from current primary care provider and other specialists while participating in this weight management program.  Patient has full access to all our office notes, orders, lab results on [facility] and can provide them copies, if desired. Advised patient to follow up with pcp for any acute symptoms and Health Maintenance. Continue current medications as directed by prescribers. Identified and discussed medications patient is taking that can cause weight gain or weight loss as recorded on patient's medication list.  Advised patient not to stop any use without discussing with the prescribing provider.   Ask prescribing providers to consider more weight neutral or weight negative options. Will monitor patient's weight and health with continued use. Supplement recommendations: Take OTC Magnesium 400 mg po at night prn sleep, muscle cramping, constipation. Take OTC vit B12 1000 mcg daily orally if taking Metformin or experiencing numbness and tingling. Take OTC Fish Oil 1000 mg with EPA and -1,000 mg daily if experiencing dry skin, low HDL. Use with caution if history of heartburn exists. Increase fiber products (I.e. fiber tea, fiber shakes) or try OTC Fiber products (I.e. Benefiber, Metamucil, psyllium, etc) if experiencing constipation or hunger. Take OTC Lactaid with meal replacements, if lactose intolerance history exists or symptoms begin. Referred patient to [facility] Patient Manual for recommended antidotes, if any new symptoms or side effects have been experienced once dietary approach is initiated. Advised patient to notify our office if this occurs. Reviewed and discussed most recent lab results. Recheck pertinent labs monthly while participating in LCD and using [facility] meal replacements, as discussed to identify electrolyte abnormalities and guide therapeutic approach. Will repeat EKG for ever 50 lbs of weight loss, per [facility] guidelines. An order form for pertinent labs was given to patient today. Patient was advised to have the labwork performed 1 week prior to the next monthly appointment with me. Labs ordered today will be reviewed in detail at the next office visit and time allowed for any questions regarding the results. Advised patient to [facility] and view lab results directly. Notify me by [address] if any questions or concerns arise. Discussed the patient's BMI, anthropometric measures and goals with patient.   Informed patient that weight loss goal of 5-10% in 6-12 months has shown significant improvement in obesity and its related health conditions including DM, heart disease, asthma. A key study published in Arthritis & Rheumatism of Overweight and Obese Adults with OA found that losing 1 pound of weight resulted in 4 pounds of pressure being removed from the knees. Dietary Prescription:    Recommended meal plan:  New Direction Low Calorie Dietary approach, 1411-4508 kcal/day, 2-3 meal replacements daily. Make sure proper daily calories are consumed with each meal.  Encouraged patient to stay within the recommended amount of calories per day. Do not skip meals. Meals must be eaten within a 3-4 hour time period in order to prevent potential side effects, including low blood sugar. Strongly emphasized that patient drink a minimum of 2 L (67 oz) of water daily up to half your body weight in ounces of water while utilizing this dietary approach to prevent dehydration and potential side effects. Drink the majority of water prior to supper to prevent nocturnal urination. Avoid water enhancers and sugar-sweetened beverages, including sodas, alcohol, juice. Limit caffeine intake to prevent sleep interference, heart palpitations and dehydration from increased urination. Will allow 1 8 oz cup of black coffee or green tea (to stimulate release of Adiponectin and promote fat burning.)    Consume any alcohol or caffeine 6-12 hours before bedtime to prevent sleep disturbances and bladder irritation at night. Referred patient to [employer]irection Patient Manual for recommended antidotes, if any new symptoms or side effects have been experienced once dietary approach is initiated. Advised patient to notify our office if this occurs. Exercise Prescription:   Emphasized importance of mild physical activity and reducing sedentary time. Advised patient to work with RD to ensure you have proper calories for your level of activity.   A goal of 30 minutes physical activity daily is recommended for health benefit and at least 60 minutes daily to prevent weight regain. During weight loss phase, no less than 75% of this time needs to be performing aerobic (i.e. Walking) activity with no more than 25% of the time performing resistance exercises (i.e. Weight lifting, strengthening, toning). During weight maintenance phase, 50% of the physical activity time needs to be spent performing aerobic activity with 50% of the total time performing resistance exercises. Behavior and Lifestyle Prescription:  Counseled patient on stressors, stress management and self-care approaches. If already receiving formal counseling please continue these sessions routinely. Go to ER if any thoughts or attempts of suicide are experienced. Referred patient to work with a counselor for further evaluation and intervention. Patient expressed appreciation. Sleep Prescription:   A goal of 7-9 hours nightly of uninterrupted sleep is recommended to turn off the Grehlin hormone to be released from the stomach and triggers appetite while promoting weight gain. Proper rest turns on Leptin hormone to be released from white adipose tissue and promotes weight loss. Discussed snoring, symptoms of Sleep Apnea and improvements with weight loss. Strongly encouraged compliance with CPAP, if Sleep Apnea has been diagnosed. Advised patient to follow up with sleep specialist for every 25 pounds lost to see if JILLIAN is present to see if CPAP settings need to be adjusted. Counseled patient on health topics:  Obesity, Insulin Resistance, LCD dietary approaches, benefits, contraindications, possible side effects to these diets and how to prevent poor outcomes (including staying hydrated, limit physical exercise while transitioning into this program, avoid skipping meals, etc), weight loss goals, sleep hygiene, barriers to losing weight and keeping weight off, strategies to overcome habits or challenges to approve or continue adherence and stressors. Immunizations noted. Influenza and Covid-19 vaccines recommended, if patient has not had it. Obesity may increase risk for severe illness and death from Covid-19 disease. Offered empathy, encouragement, legitimation, prayers, partnership to patient. Praised patient for successes. Patient was offered a choice/choices in the treatment plan today. Patient expressed understanding with the diagnoses and the plan and agrees with recommendations. Return in about 1 month (around 12/3/2021) for ND LCD, results. Total time:  28 mins spent in counseling, coordinating care, reviewing and discussing results, reviewing and discussing relevant provider communication, completing relevant documentation, and discussing treatment plans in reference to The primary encounter diagnosis was Obesity, Class I, BMI 30-34.9. Diagnoses of Vitamin D deficiency, Anxiety and depression, High cholesterol, LIDIA (sensory integration disorder), Asperger's syndrome, Attention deficit disorder (ADD) without hyperactivity, Chronic insomnia, and BMI 31.0-31.9,adult were also pertinent to this visit. Chente Link was evaluated through a synchronous (real-time) audio-video encounter. The patient (or guardian if applicable) is aware that this is a billable service. Verbal consent to proceed has been obtained within the past 12 months. The visit was conducted pursuant to the emergency declaration under the [facility], [address] [place] [address] authority and the [employer] and [employer] General Act. Patient identification was verified. The patient was located in a state where the provider was credentialed to provide care. [address], MD FOR ALLOWING ME THE PRIVILEGE TO PARTICIPATE IN THE CARE OF OUR MUTUAL PATIENT, Mr. Roseline Goldberg. Simon Hutton DO, DiplSUBHASH Ruiz    There are no Patient Instructions on file for this visit.

## 2021-11-03 NOTE — PROGRESS NOTES
Weight Management. 1. Have you been to the ER, urgent care clinic since your last visit? Hospitalized since your last visit? No    2. Have you seen or consulted any other health care providers outside of the [address] [place] [address] since your last visit? Include any pap smears or colon screening. No     Week # 1    Progress Note: Weekly Education Class in the [facility] Loss [facility]         Patient is on Very Low Calorie Diet [] (4 meal replacements per day, 800 kcal/day)      Low Calorie Diet [x] (2-3 meal replacements per day, 7404-0871 kcal/day)    1) Did patient have any new symptoms or physical problems? Yes []    No [x]    If yes, check & comment: weakness [], fatigue [], lightheadedness [], headache [], cramps [], cold intolerance [], hair loss [], diarrhea [], constipation [],  NA [] other:                                 2) Has patient had any medical attention from other providers, urgent care or the emergency room this week? Yes []  No [x]       NA [], If yes, why:                                     3) Any other sugar sweetened beverages consumed this week? Yes []  No [x]    4) Did patient have any problems adhering to the diet? Yes []  No [x] NA []    If yes, Vacation [], Celebrations [], Conferences [], Family Reunions [] other:                                                5) How many hours of sleep this week? 8-9    (range)  NA []    Number of meal replacements consumed daily? 4-5 (range)  NA []    Average ounces of water patient consumed daily this week (not including shakes)? (divide the weekly total by 7)NOT ANSWERED    Did you eat any food outside of the program? Yes [x] No []    Physical Activity Over the Past Week:    Cardio exercise: 0 min  Strength exercise: 0 workouts / week  Number of steps walked per day: 0    How has patient mood overall been this week?  Sad [], Happy [], Stressed [], Tired [], Content [], NA [], other FINE           Medications reconciled by nurse Yes [x]  No[]    Patient was given therapeutic recommendations for any noted side effects of their dietary approach based upon [facility] patient manual per providers recommendation. Week #  2    Progress Note: Weekly Education Class in the [facility] Weight Loss Program         Patient is on Very Low Calorie Diet [] (4 meal replacements per day, 800 kcal/day)      Low Calorie Diet [x] (2-3 meal replacements per day, 2514-1957 kcal/day)    1) Did patient have any new symptoms or physical problems? Yes []    No [x]    If yes, check & comment: weakness [], fatigue [], lightheadedness [], headache [], cramps [], cold intolerance [], hair loss [], diarrhea [], constipation [],  NA [] other:                                 2) Has patient had any medical attention from other providers, urgent care or the emergency room this week? Yes []  No [x]       NA [], If yes, why:                                       3) Any other sugar sweetened beverages consumed this week? Yes []  No [x]    4) Did patient have any problems adhering to the diet? Yes []  No [x] NA []    If yes, Vacation [], Celebrations [], Conferences [], Family Reunions [] other:                                                5) How many hours of sleep this week? 8-9    (range)  NA []    Number of meal replacements consumed daily? 4-5 (range)  NA []    Average ounces of water patient consumed daily this week (not including shakes)? 90     (divide the weekly total by 7)    Did you eat any food outside of the program? Yes [x] No []    Physical Activity Over the Past Week:    Cardio exercise: 0 min  Strength exercise: 0 workouts / week  Number of steps walked per day: 0    How has patient mood overall been this week?  Sad [], Happy [], Stressed [], Tired [], Content [], NA [], other FINE           Medications reconciled by nurse Yes [x]  No[]    Patient was given therapeutic recommendations for any noted side effects of their dietary approach based upon [employer] patient manual per providers recommendation.

## 2021-11-03 NOTE — PROGRESS NOTES
Weight Management. 1 month follow up. 1. Have you been to the ER, urgent care clinic since your last visit? Hospitalized since your last visit? No    2. Have you seen or consulted any other health care providers outside of the [address] [place] [address] since your last visit? Include any pap smears or colon screening.  No

## 2021-11-15 ENCOUNTER — CLINICAL SUPPORT (OUTPATIENT)
Dept: SURGERY | Age: 26
End: 2021-11-15

## 2021-11-15 VITALS
HEART RATE: 74 BPM | SYSTOLIC BLOOD PRESSURE: 106 MMHG | OXYGEN SATURATION: 98 % | HEIGHT: 69 IN | BODY MASS INDEX: 31.4 KG/M2 | RESPIRATION RATE: 18 BRPM | DIASTOLIC BLOOD PRESSURE: 62 MMHG | WEIGHT: 212 LBS

## 2021-11-15 DIAGNOSIS — E66.9 OBESITY, CLASS I, BMI 30-34.9: Primary | ICD-10-CM

## 2021-11-15 NOTE — PROGRESS NOTES
Weight management. 1. Have you been to the ER, urgent care clinic since your last visit? Hospitalized since your last visit? No    2. Have you seen or consulted any other health care providers outside of the [address] [place] [address] since your last visit? Include any pap smears or colon screening. No     Week # 1    Progress Note: Weekly Education Class in the [facility] Loss [facility]         Patient is on Very Low Calorie Diet [] (4 meal replacements per day, 800 kcal/day)      Low Calorie Diet [x] (2-3 meal replacements per day, 8093-3014 kcal/day)    1) Did patient have any new symptoms or physical problems? Yes []    No [x]    If yes, check & comment: weakness [], fatigue [], lightheadedness [], headache [], cramps [], cold intolerance [], hair loss [], diarrhea [], constipation [],  NA [] other:                                 2) Has patient had any medical attention from other providers, urgent care or the emergency room this week? Yes []  No [x]       NA [], If yes, why:                                       3) Any other sugar sweetened beverages consumed this week? Yes []  No [x]    4) Did patient have any problems adhering to the diet? Yes []  No [x] NA []    If yes, Vacation [], Celebrations [], Conferences [], Family Reunions [] other:                                                5) How many hours of sleep this week? 9-10    (range)  NA []    Number of meal replacements consumed daily? 4-5 (range)  NA []    Average ounces of water patient consumed daily this week (not including shakes)? 90     (divide the weekly total by 7)    Did you eat any food outside of the program? Yes [x] No []    Physical Activity Over the Past Week:    Cardio exercise: 0 min  Strength exercise: 0 workouts / week  Number of steps walked per day: 0    How has patient mood overall been this week?  Sad [], Happy [], Stressed [], Tired [], Content [], NA [], other Great           Medications reconciled by nurse Yes [x]  No[]    Patient was given therapeutic recommendations for any noted side effects of their dietary approach based upon [facility] patient manual per providers recommendation. Week # 2    Progress Note: Weekly Education Class in the [facility] Weight Loss Program         Patient is on Very Low Calorie Diet [] (4 meal replacements per day, 800 kcal/day)      Low Calorie Diet [] (2-3 meal replacements per day, 0292-4590 kcal/day)    1) Did patient have any new symptoms or physical problems? Yes []    No [x]    If yes, check & comment: weakness [], fatigue [], lightheadedness [], headache [], cramps [], cold intolerance [], hair loss [], diarrhea [], constipation [],  NA [] other:                                 2) Has patient had any medical attention from other providers, urgent care or the emergency room this week? Yes []  No [x]       NA [], If yes, why:                                       3) Any other sugar sweetened beverages consumed this week? Yes []  No [x]    4) Did patient have any problems adhering to the diet? Yes []  No [x] NA []    If yes, Vacation [], Celebrations [], Conferences [], Family Reunions [] other:                                                5) How many hours of sleep this week? 9-10    (range)  NA []    Number of meal replacements consumed daily? 4-5 (range)  NA []    Average ounces of water patient consumed daily this week (not including shakes)? 90     (divide the weekly total by 7)    Did you eat any food outside of the program? Yes [x] No []    Physical Activity Over the Past Week:    Cardio exercise: 0 min  Strength exercise: 0 workouts / week  Number of steps walked per day: 0    How has patient mood overall been this week?  Sad [], Happy [], Stressed [], Tired [], Content [], NA [], other Great           Medications reconciled by nurse Yes [x]  No[]    Patient was given therapeutic recommendations for any noted side effects of their dietary approach based upon [facility] patient manual per providers recommendation.

## 2021-12-02 NOTE — PROGRESS NOTES
Markel Richardson presents for weekly or bi-monthly evaluation of Obesity Body mass index is 31.57 kg/m². Visit Vitals  /62 (BP 1 Location: Right arm, BP Patient Position: Sitting, BP Cuff Size: Adult)   Pulse 65   Resp 18   Ht 5' 9\" (1.753 m)   Wt 213 lb 12.8 oz (97 kg)   SpO2 97%   BMI 31.57 kg/m²       Wt Readings from Last 3 Encounters:   11/15/21 212 lb (96.2 kg)   11/03/21 213 lb 12.8 oz (97 kg)   10/22/21 213 lb 9.6 oz (96.9 kg)       1. Obesity, Class I, BMI 30-34.9         I have reviewed and agree with nurse documentation of Weekly Education Class nurse progress note for [facility] Weight [address] [place] [address] [place]). Markel Richardson is compliant with program requirements and may continue participation utilizing the [employer] meal replacements, as discussed. Patient has been advised to refer to the [facility] Patient Manual and notify us if any side effects to this meal plan are present and/or persist.  Markel Richardson may continue the current dietary approach, care and follow up at the monthly office visit with the provider, as scheduled. Follow-up and Dispositions    · Return in about 2 weeks (around 11/17/2021). Documentation true and accepted by Maria L Bai., AOBHAVIKP, Diplomate [employer]

## 2021-12-02 NOTE — PROGRESS NOTES
Mack Peacock presents for weekly or bi-monthly evaluation of Obesity Body mass index is 31.31 kg/m². Visit Vitals  /62 (BP 1 Location: Right arm, BP Patient Position: Sitting, BP Cuff Size: Adult)   Pulse 74   Resp 18   Ht 5' 9\" (1.753 m)   Wt 212 lb (96.2 kg)   SpO2 98%   BMI 31.31 kg/m²       Wt Readings from Last 3 Encounters:   11/15/21 212 lb (96.2 kg)   11/03/21 213 lb 12.8 oz (97 kg)   10/22/21 213 lb 9.6 oz (96.9 kg)       1. Obesity, Class I, BMI 30-34.9         I have reviewed and agree with nurse documentation of Weekly Education Class nurse progress note for [facility] Weight [address] [facility] [place]). Mack Peacock is compliant with program requirements and may continue participation utilizing the [employer] meal replacements, as discussed. Patient has been advised to refer to the [facility] Patient Manual and notify us if any side effects to this meal plan are present and/or persist.  Mack Peacock may continue the current dietary approach, care and follow up at the monthly office visit with the provider, as scheduled. Follow-up and Dispositions    · Return in about 2 weeks (around 11/29/2021). Documentation true and accepted by Gloria Frances., AOBFP, Diplomate [employer]

## 2022-01-06 ENCOUNTER — OFFICE VISIT (OUTPATIENT)
Dept: SURGERY | Age: 27
End: 2022-01-06
Payer: MEDICARE

## 2022-01-06 VITALS
DIASTOLIC BLOOD PRESSURE: 89 MMHG | HEART RATE: 62 BPM | RESPIRATION RATE: 18 BRPM | WEIGHT: 215.9 LBS | BODY MASS INDEX: 31.98 KG/M2 | OXYGEN SATURATION: 98 % | TEMPERATURE: 98.4 F | HEIGHT: 69 IN | SYSTOLIC BLOOD PRESSURE: 135 MMHG

## 2022-01-06 DIAGNOSIS — E66.9 OBESITY, CLASS I, BMI 30-34.9: Primary | ICD-10-CM

## 2022-01-06 DIAGNOSIS — R63.5 ABNORMAL WEIGHT GAIN: ICD-10-CM

## 2022-01-06 DIAGNOSIS — F51.04 CHRONIC INSOMNIA: ICD-10-CM

## 2022-01-06 DIAGNOSIS — F32.A ANXIETY AND DEPRESSION: ICD-10-CM

## 2022-01-06 DIAGNOSIS — Z82.3 FAMILY HISTORY OF STROKE: ICD-10-CM

## 2022-01-06 DIAGNOSIS — F88 SID (SENSORY INTEGRATION DISORDER): ICD-10-CM

## 2022-01-06 DIAGNOSIS — F98.8 ATTENTION DEFICIT DISORDER (ADD) WITHOUT HYPERACTIVITY: ICD-10-CM

## 2022-01-06 DIAGNOSIS — Z83.49 FAMILY HISTORY OF HYPERTHYROIDISM: ICD-10-CM

## 2022-01-06 DIAGNOSIS — Z82.62 FAMILY HISTORY OF DISUSE OSTEOPOROSIS: ICD-10-CM

## 2022-01-06 DIAGNOSIS — F84.5 ASPERGER'S SYNDROME: ICD-10-CM

## 2022-01-06 DIAGNOSIS — Z82.49 FAMILY HISTORY OF ABDOMINAL AORTIC ANEURYSM (AAA): ICD-10-CM

## 2022-01-06 DIAGNOSIS — E78.00 HIGH CHOLESTEROL: ICD-10-CM

## 2022-01-06 DIAGNOSIS — Z80.7 FAMILY HISTORY OF NON-HODGKIN'S LYMPHOMA: ICD-10-CM

## 2022-01-06 DIAGNOSIS — E55.9 VITAMIN D DEFICIENCY: ICD-10-CM

## 2022-01-06 DIAGNOSIS — F41.9 ANXIETY AND DEPRESSION: ICD-10-CM

## 2022-01-06 DIAGNOSIS — E78.5 DYSLIPIDEMIA: ICD-10-CM

## 2022-01-06 DIAGNOSIS — E88.81 METABOLIC SYNDROME: ICD-10-CM

## 2022-01-06 PROCEDURE — 99213 OFFICE O/P EST LOW 20 MIN: CPT | Performed by: FAMILY MEDICINE

## 2022-01-06 RX ORDER — MINOXIDIL 2.5 MG/1
2.5 TABLET ORAL DAILY
COMMUNITY
Start: 2021-12-11

## 2022-01-06 NOTE — PROGRESS NOTES
Weight Management. Restarting program.      1. Have you been to the ER, urgent care clinic since your last visit? Hospitalized since your last visit? No    2. Have you seen or consulted any other health care providers outside of the [address] [place] [address] since your last visit? Include any pap smears or colon screening.  No     BMI - 31.6

## 2022-01-06 NOTE — PROGRESS NOTES
[address] [place] [address] 22.  [phone] o  [phone] f    FOLLOW UP MEDICAL EXAMINATION    Method of Delivery:   Face to Face  Patient Name:  Bi Sandhu, 1995  PCP:  Sourav Song DO    Patient's preferred weight management approach:  Low Calorie Diet (LCD, 6109-6458 kcal/day), Number meal replacements consumed per day:  0 since I ran out in December. Bi Sandhu is a 27 y.o. male who is a patient with Obesity Class 1 Body mass index is 31.88 kg/m². and associated health concerns. Patient presents today for Weight Management    Challenges adhering to the plan over the past month: We hosted [place] dinner at my house and had lots of delicious leftovers x 1 week. I ran out of ND meal replacements x 1 month.     Patient goals for participation in this weight management program:   228 lbs in 4 months by 10/2021 (Myxyukol69/03/21!)  Now pt wants to continue the same rate of weight loss for the next 6 months (03/2022 11 lbs/month avg) lbs or to lose and get in the acceptable range of body fat    Anthropometric Measures Previously    Start Date:  06/03/21  Start Weight:             258 lbs 0 oz          BMI:   38.10 kg/m2       Neck circumference:  17.7 in                                   Waist circumference:  50.5 in                    Body Fat Percentage:  31.7 %     Weight at last appointment on 11/03/21:  213 lbs 0 oz        Anthropometric MeasuresToday  Today's Weight 1/6/2022:  215 lbs 14.4 oz      Weight Metrics 1/6/2022 1/6/2022 11/15/2021 11/15/2021 11/3/2021 10/22/2021 10/8/2021   Weight - 215 lb 14.4 oz - 212 lb 213 lb 12.8 oz 213 lb 9.6 oz 217 lb 12.8 oz   Neck Circ (inches) 16.25 - - - - - -   Waist Measure Inches 41 - - - - - -   Body Fat % 26.6 - 24.2 - - - -   BMI - 31.88 kg/m2 - 31.31 kg/m2 31.57 kg/m2 31.54 kg/m2 32.16 kg/m2     Neck Circumference:  Acceptable range for M >16 inches, F>15 inches  Waist Circumference:  Acceptable range for M> 40 inches/102 cm, F > 35 inches, 88 cm  Body Fat: Acceptable range for M 18-24%, F 25-31%    Pounds loss since the last doctor appointment with our Center a month ago:  0 lbs, gained 2 lbs  Total pounds loss since starting this therapeutic approach on start date: 43 lbs. Is patient satisfied with his/her progress since the last appointment: no, I could've done better  Factors contributing to weight change:  Allyson and leftovers    SURGICAL HISTORY  Previous Weight Loss Surgery:  0     History reviewed. No pertinent surgical history. WEIGHT LOSS MEDICATION HISTORY  Current weight loss medication:  0   Weight loss medication prescribed by our office:  0      Outpatient Medications Marked as Taking for the 1/6/22 encounter (Office Visit) with Rachel Benton, DO   Medication Sig Dispense Refill    minoxidiL (LONITEN) 2.5 mg tablet Take 2.5 mg by mouth daily.  B.infantis-B.ani-B.long-B.bifi (Probiotic 4X) 10-15 mg TbEC Take  by mouth.  pantoprazole (PROTONIX) 40 mg tablet Take 40 mg by mouth daily.  escitalopram oxalate (LEXAPRO) 10 mg tablet Take 10 mg by mouth daily.  zolpidem CR (AMBIEN CR) 12.5 mg tablet Take 12.5 mg by mouth nightly.  MEN'S MULTI-VITAMIN PO Take 1 Tablet by mouth daily.  omega 3-dha-epa-fish oil (Fish Oil) 100-160-1,000 mg cap Take 2,000 mg by mouth daily.  cholecalciferol (Vitamin D3) 25 mcg (1,000 unit) cap Take 1,000 Units by mouth daily.  magnesium 250 mg tab Take 1 Tablet by mouth daily.  melatonin 5 mg cap capsule Take 5 mg by mouth nightly.        Medications that cause weight gain:  Minoxidil, Lexapro  Medications that cause weight loss:  0  Any changes to medications since last office visit with me:  0  Allergies   Allergen Reactions    Caffeine Other (comments)     THROAT TIGHTENING    Suprax [Cefixime] Unknown (comments)     As a child       EATING HABITS  Has patient met with the RD over the past month:  no  Nutritional changes made over the last month per recommendation of the RD:  na   Are hunger, cravings and appetite controlled:  yes  Negative Side Effects from meal replacements:  0   Does patient want to utilize New Direction meal replacements:  yes  Barriers to eating meals:  Encinal eating    DRINKING HABITS  Average amount of water consumed daily: 60-90 oz/day  (Goal 2 L or 67 oz daily, minimally)  Amount of caffeine consumed daily: 0 oz - I am allergic to caffeine   Sugar-sweetened beverages consumed daily (including alcohol, sodas, teas, juices, etc.): wine 1-2 glasses weekly during the holiday, Skittles flavored water enhancer in all my water    Barriers preventing patient from drinking minimum 2L water/day:  0     Social History     Tobacco Use    Smoking status: Never Smoker    Smokeless tobacco: Never Used   Vaping Use    Vaping Use: Never used   Substance Use Topics    Alcohol use: Yes     Comment: weekends, socially    Drug use: Not Currently         SLEEP HABITS  Total hours of sleep nightly: 8-10 hours (Goal 7-9 hours/nightly)  Sleep Hx:  0   Rx or OTC Sleep Aids:  Rx Ambien (ran out of melatonin)   Occupation:   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 Shai Crews, psychiatrist previously now sees pcp for Rx since stable - Lexapro 10 mg daily  Stressors - It is rather complex to explain  Is mental health condition controlled: yes    Mobile Apps used for meal planning, calorie counting, water consumption, sleep, or physical activity:  0     ASSOCIATED MEDICAL CONDITIONS  Past Medical History:   Diagnosis Date    ADD (attention deficit disorder) 05/21/2015    Anxiety 05/21/2015    Dr. Maryann Olivera Asperger's syndrome 05/21/2015    Chronic insomnia     Patellar dislocation     Left    LIDIA (sensory integration disorder) 05/21/2015    Dr. Isidro Noyola at [facility], peds genetics       Do you have upcoming travel in the next 6 weeks:  0. Patient will also notify the dietician for recommendations during travel. Immunization History   Administered Date(s) Administered    COVID-19, PFIZER, MRNA, LNP-S, PF, 30MCG/0.3ML DOSE 03/12/2021, 04/09/2021       OTHER MEDICAL CARE SINCE LAST OFFICE VISIT  I saw an NP for my HH. Not an issue. ROS  Pt denies hunger, cravings, lack of focus, fatigue, feeling weak, headaches, dizziness, light headedness, nausea, vomiting, diarrhea, constipation, indigestion, rapid heart rate, SOB, low blood sugar, feeling cold, hair loss, rash, fluid retention, leg aches, difficulty sleeping, irritability, mood swings, or other associated symptoms. Review of Systems negative except as noted above in HPI. HEALTH MAINTENANCE  A1c:  SEE RESULTS BELOW     Lipids:  SEE RESULTS BELOW  Depression Screening:  Performed during [facility] Visit to [facility]  Colonoscopy:  0, if patient is over 49 yo  Mammogram:  na, if female patient is over 37 yo  Annual Wellness Exam:  To be performed by PCP, when appropriate.     PHYSICAL EXAMINATION    Visit Vitals  /89 (BP 1 Location: Right arm, BP Patient Position: Sitting, BP Cuff Size: Adult)   Pulse 62   Temp 98.4 °F (36.9 °C) (Oral)   Resp 18   Ht 5' 9\" (1.753 m)   Wt 215 lb 14.4 oz (97.9 kg)   SpO2 98%   BMI 31.88 kg/m²         Weight Metrics 1/6/2022 1/6/2022 11/15/2021 11/15/2021 11/3/2021 10/22/2021 10/8/2021   Weight - 215 lb 14.4 oz - 212 lb 213 lb 12.8 oz 213 lb 9.6 oz 217 lb 12.8 oz   Neck Circ (inches) 16.25 - - - - - -   Waist Measure Inches 41 - - - - - -   Body Fat % 26.6 - 24.2 - - - -   BMI - 31.88 kg/m2 - 31.31 kg/m2 31.57 kg/m2 31.54 kg/m2 32.16 kg/m2          General appearance - Well nourished. Well appearing. Well developed. No acute distress. Obese. Head - Normocephalic. Atraumatic. Eyes - Extraocular eye movements intact. Sclera anicteric. Ears - Hearing is grossly normal bilaterally. Nose - normal and patent. Mouth - mucous membranes with adequate moisture. Neck - supple. Midline trachea. No carotid bruits noted bilaterally. No thyromegaly noted. Chest - clear to auscultation bilaterally anteriorly and posteriorly. No wheezes. No rales or rhonchi. Breath sounds are symmetrical bilaterally. Unlabored respirations. Heart - normal rate. Regular rhythm. Normal S1, S2. No murmur noted. No rubs, clicks or gallops noted. Abdomen - soft and distended. No masses or organomegaly. No rebound, rigidity or guarding. Bowel sounds normal x 4 quadrants. No tenderness noted. Neurological - awake, alert and oriented to person, place, and time and event. Cranial nerves II through XII intact. Clear speech. Muscle strength is +5/5 x 4 extremities. Heme/Lymph - peripheral pulses normal x 4 extremities. No peripheral edema is noted. Musculoskeletal - Intact x 4 extremities. No pain with movement. Back exam - normal range of motion. No CVA tenderness. Negative Straight Leg Test bilaterally. No buffalo hump noted. Skin - no rashes, erythema, ecchymosis, lacerations, abrasions, suspicious moles noted. No skin tags or moles.   No acanthosis nigricans noted in the axilla or neck.  Psychological -   normal behavior, dress and thought processes. Good insight. Good eye contact. Flat affect. Appropriate mood. Normal speech. DATA REVIEWED    No results found for: WBC, WBCLT, HGBPOC, HGB, HGBP, HCTPOC, HCT, PHCT, RBCH, PLT, MCV, HGBEXT, HCTEXT, PLTEXT  Lab Results   Component Value Date/Time    Sodium 140 07/03/2021 08:13 AM    Potassium 5.0 07/03/2021 08:13 AM    Chloride 102 07/03/2021 08:13 AM    CO2 26 07/03/2021 08:13 AM    Glucose 90 07/03/2021 08:13 AM    BUN 19 07/03/2021 08:13 AM    Creatinine 0.98 07/03/2021 08:13 AM    BUN/Creatinine ratio 19 07/03/2021 08:13 AM    GFR est  07/03/2021 08:13 AM    GFR est non- 07/03/2021 08:13 AM    Calcium 10.0 07/03/2021 08:13 AM    Bilirubin, total 0.5 07/03/2021 08:13 AM    Alk. phosphatase 74 07/03/2021 08:13 AM    Protein, total 6.7 07/03/2021 08:13 AM    Albumin 4.6 07/03/2021 08:13 AM    A-G Ratio 2.2 07/03/2021 08:13 AM    ALT (SGPT) 35 07/03/2021 08:13 AM    AST (SGOT) 24 07/03/2021 08:13 AM     Lab Results   Component Value Date/Time    Hemoglobin A1c 4.9 07/03/2021 08:13 AM     Lab Results   Component Value Date/Time    TSH 1.000 07/03/2021 08:13 AM     No results found for: URICA, UAU1  No results found for: MG  No results found for: B12LT, FOL, RBCF  No results found for: VITD3, XQVID2, XQVID3, XQVID, VD3RIA, FSYK91HQKGS  No results found for: IRON, FE, TIBC, IBCT, PSAT, FERR  Lab Results   Component Value Date/Time    Cholesterol, total 197 07/03/2021 08:13 AM    HDL Cholesterol 36 (L) 07/03/2021 08:13 AM    LDL, calculated 131 (H) 07/03/2021 08:13 AM    VLDL, calculated 30 07/03/2021 08:13 AM    Triglyceride 169 (H) 07/03/2021 08:13 AM     No results found for this or any previous visit. EKG:  Not performed yet    ASSESSMENT     ICD-10-CM ICD-9-CM    1. Obesity, Class I, BMI 30-34.9  E66.9 278.00    2. Vitamin D deficiency  E55.9 268.9 VITAMIN D, 25 HYDROXY   3.  High cholesterol  E78.00 272.0 URIC ACID METABOLIC PANEL, COMPREHENSIVE   4. Anxiety and depression  F41.9 300.00     F32. A 311    5. Asperger's syndrome  F84.5 299.80    6. LIDIA (sensory integration disorder)  F88 315.8    7. Chronic insomnia  F51.04 780.52    8. Attention deficit disorder (ADD) without hyperactivity  F98.8 314.00        We discussed the expected course, resolution and complications of the diagnosis(es) in detail. WEIGHT MANAGEMENT PLAN    Chart was reviewed and updated during the office visit today. Rosie Nielson has fulfilled [facility] program requirements this month by completing homework forms, attending educational classes, nurse triage visits and monthly provider appointments as agreed and remains in good standing. Reviewed and appreciate registered dietician note for nutritional counseling. Patient has attended all requirements of the program over the past month and is in good standing. Reviewed and appreciate bi-monthly nurse visits and agree with documentation. Followed up on any patient concerns today. Emphasized the importance of the patient continuing care from current primary care provider and other specialists while participating in this weight management program.    Patient has full access to all our office notes, orders, lab results on [employer] and can provide them copies, if desired. Advised patient to follow up with pcp for Health Maintenance and any acute symptoms. Reviewed and discussed most recent lab results. Informed patient that an EKG will be performed for every 50 lbs of weight loss. Recheck pertinent labs every 3 months while participating in LCD using [facility] meal replacements, as discussed to identify electrolyte abnormalities and guide therapeutic approach. An order form for pertinent labs was given to patient today. Patient was advised to have the labwork performed 1 week prior to that appointment with me.     Advised patient to sign up for [employer] and view lab results directly. Notify me by [facility] if any questions or concerns arise. Labs ordered today will be reviewed in detail at the next office visit and time allowed for any questions regarding the results. Discussed the patient's medications, BMI, anthropometric measures and goals with patient. Informed patient that weight loss goal of 5-10% in 6-12 months has shown significant improvement in obesity and its related health conditions including DM, heart disease, asthma. A key study published in Arthritis & Rheumatism of Overweight and Obese Adults with OA found that losing 1 pound of weight resulted in 4 pounds of pressure being removed from the knees. Continue current medications as directed by prescribers. Identified and discussed medications the patient is taking that can cause weight gain or weight loss as recorded on patient's medication list.  Advised patient not to stop use of any without discussing with the prescribing provider. Recommended patient to ask prescribing providers to consider more weight neutral or weight negative options. Will monitor patient's weight and health with continued use of current medications. Referred patient to [facility]irection Patient Manual for recommended antidotes. If any new symptoms or side effects have been experienced once dietary approach is initiated, advised patient to notify our office. Dietary Prescription:    Recommended meal plan:  New Direction Low Calorie Dietary approach, 5878-0739 kcal/day, 2-3 meal replacements daily with 1 \"grocery\" meal.   Work with RD to get back on track. Make sure proper daily calories are consumed for each meal.  Encouraged patient to stay within the recommended amount of calories per day   Reviewed nutrition and importance of regular protein intake and minimizing hidden carbohydrate sources.    Decrease simple carbohydrates in any regular meal allowed (white foods, sweet foods, sweet drinks and alcohol), increase green leafy vegetables and protein (lean meats and beans) with each meal.    Avoid fried foods and limit saturated fats. Do not skip meals. Eat meals within 3-4 hours to prevent low blood sugar events. Emphasized importance of drinking a minimum of 2 L (67 oz) of water daily up to half your body weight in ounces of water while utilizing this dietary approach to prevent dehydration and potential side effects. Avoid water enhancers or sugar-sweetened beverages including alcohol, juice, soda, lemonade. Try OTC water infusion recipes. Limit caffeine, will allow 1 8 oz cup of black coffee or green tea (to stimulate release of Adiponectin and promote fat burning.)     Exercise Prescription:   Emphasized importance of reducing sedentary time and performing physical activity at least 5 days a week. A goal of 30 minutes physical activity daily is recommended for health benefit and at least 60 minutes daily to prevent weight regain. During weight loss phase, no less than 75% of this time needs to be performing aerobic (i.e. Walking) activity with no more than 25% of the time performing resistance exercises (i.e. Weight lifting, strengthening, toning). During weight maintenance phase, 50% of the physical activity time needs to be spent performing aerobic activity with 50% of the total time performing resistance exercises. Behavior and Lifestyle Prescription:  Counseled patient on stressors, stress management and self-care approaches. Strongly encouraged pt to seek formal counseling for stressors mentioned today. If already receiving formal counseling please continue these sessions routinely. Go to ER if any thoughts or attempts of suicide are experienced. Referred patient to work with a counselor for further evaluation and intervention. Patient expressed appreciation.     Sleep Prescription:   A goal of 7-9 hours nightly of uninterrupted sleep is recommended to turn off the Grehlin hormone to be released from the stomach and triggers appetite while promoting weight gain. Proper rest turns on Leptin hormone to be released from white adipose tissue and promotes weight loss. Avoid alcohol and caffeine 6-12 hours before bedtime to reduce risk of sleep disturbance and bladder irritation while asleep. Discussed snoring, symptoms of Sleep Apnea and improvements with weight loss. Strongly encouraged compliance with CPAP, if Sleep Apnea has been diagnosed. Advised patient to follow up with sleep specialist for every 25 pounds lost to see if CPAP settings need to be adjusted. Counseled patient on health topics:    Obesity, Insulin Resistance, LCD dietary approaches, benefits, contraindications, possible side effects to these diets and how to prevent poor outcomes (including staying hydrated, limit physical exercise while transitioning into this program, avoid skipping meals, etc), weight loss goals, sleep hygiene, stressors, barriers to losing weight and keeping weight off, strategies to overcome habits or challenges to improve or continue adherence. Immunizations noted. Influenza and Covid-19 vaccines recommended, if patient has not had it. Obesity may increase risk for severe illness and death from Covid-19 disease. Offered empathy, encouragement, legitimation, prayers, partnership to patient. Praised patient for successes. Patient was offered a choice/choices in the treatment plan today. Patient expressed understanding with the diagnoses and the plan and agrees with recommendations. Return in about 1 month (around 2/6/2022) for ND LCD. Total time:  20 mins spent in counseling, coordinating care, reviewing and discussing results, reviewing relevant documentation from other providers, completing relevant documentation, and discussing treatment plans in reference to The primary encounter diagnosis was Obesity, Class I, BMI 30-34.9.  Diagnoses of Vitamin D deficiency, Dyslipidemia, High cholesterol, Anxiety and depression, Abnormal weight gain, Asperger's syndrome, Metabolic syndrome, LIDIA (sensory integration disorder), Chronic insomnia, Attention deficit disorder (ADD) without hyperactivity, Family history of stroke, Family history of abdominal aortic aneurysm (AAA), Family history of non-Hodgkin's lymphoma, Family history of disuse osteoporosis, Family history of hyperthyroidism, and BMI 31.0-31.9,adult were also pertinent to this visit. THANK YOU Abida Garcia DO FOR ALLOWING ME THE PRIVILEGE TO PARTICIPATE IN THE CARE OF OUR MUTUAL PATIENT, Mr. Leanne Montague. Waqas Oneil DO, Diplomate [employer]BH[employer]YNOLDS    There are no Patient Instructions on file for this visit.

## 2022-01-07 PROBLEM — R63.5 ABNORMAL WEIGHT GAIN: Status: ACTIVE | Noted: 2022-01-07

## 2022-01-07 PROBLEM — E78.5 DYSLIPIDEMIA: Status: ACTIVE | Noted: 2022-01-07
